# Patient Record
Sex: FEMALE | Race: WHITE | Employment: OTHER | URBAN - METROPOLITAN AREA
[De-identification: names, ages, dates, MRNs, and addresses within clinical notes are randomized per-mention and may not be internally consistent; named-entity substitution may affect disease eponyms.]

---

## 2017-08-08 PROBLEM — M54.6 THORACIC BACK PAIN: Status: ACTIVE | Noted: 2017-08-08

## 2017-09-05 PROBLEM — S32.000A COMPRESSION FRACTURE OF LUMBAR VERTEBRA (HCC): Status: ACTIVE | Noted: 2017-09-05

## 2018-05-08 ENCOUNTER — APPOINTMENT (OUTPATIENT)
Dept: CT IMAGING | Age: 83
End: 2018-05-08
Payer: COMMERCIAL

## 2018-05-08 ENCOUNTER — HOSPITAL ENCOUNTER (EMERGENCY)
Age: 83
Discharge: OTHER INSTITUTION | End: 2018-05-08
Attending: EMERGENCY MEDICINE
Payer: COMMERCIAL

## 2018-05-08 VITALS
HEIGHT: 66 IN | DIASTOLIC BLOOD PRESSURE: 69 MMHG | WEIGHT: 135 LBS | BODY MASS INDEX: 21.69 KG/M2 | SYSTOLIC BLOOD PRESSURE: 136 MMHG | OXYGEN SATURATION: 99 % | HEART RATE: 77 BPM | RESPIRATION RATE: 18 BRPM | TEMPERATURE: 97.8 F

## 2018-05-08 DIAGNOSIS — S09.90XA INJURY OF HEAD, INITIAL ENCOUNTER: Primary | ICD-10-CM

## 2018-05-08 DIAGNOSIS — S16.1XXA STRAIN OF NECK MUSCLE, INITIAL ENCOUNTER: ICD-10-CM

## 2018-05-08 LAB
BASOPHILS ABSOLUTE: 0.05 E9/L (ref 0–0.2)
BASOPHILS RELATIVE PERCENT: 0.7 % (ref 0–2)
EOSINOPHILS ABSOLUTE: 0.13 E9/L (ref 0.05–0.5)
EOSINOPHILS RELATIVE PERCENT: 1.7 % (ref 0–6)
HCT VFR BLD CALC: 33.4 % (ref 34–48)
HEMOGLOBIN: 10.8 G/DL (ref 11.5–15.5)
IMMATURE GRANULOCYTES #: 0.07 E9/L
IMMATURE GRANULOCYTES %: 0.9 % (ref 0–5)
INR BLD: 2.2
LYMPHOCYTES ABSOLUTE: 1.09 E9/L (ref 1.5–4)
LYMPHOCYTES RELATIVE PERCENT: 14.7 % (ref 20–42)
MCH RBC QN AUTO: 30.9 PG (ref 26–35)
MCHC RBC AUTO-ENTMCNC: 32.3 % (ref 32–34.5)
MCV RBC AUTO: 95.7 FL (ref 80–99.9)
MONOCYTES ABSOLUTE: 0.55 E9/L (ref 0.1–0.95)
MONOCYTES RELATIVE PERCENT: 7.4 % (ref 2–12)
NEUTROPHILS ABSOLUTE: 5.54 E9/L (ref 1.8–7.3)
NEUTROPHILS RELATIVE PERCENT: 74.6 % (ref 43–80)
PDW BLD-RTO: 13 FL (ref 11.5–15)
PLATELET # BLD: 172 E9/L (ref 130–450)
PMV BLD AUTO: 9.6 FL (ref 7–12)
PROTHROMBIN TIME: 24.9 SEC (ref 9.3–12.4)
RBC # BLD: 3.49 E12/L (ref 3.5–5.5)
WBC # BLD: 7.4 E9/L (ref 4.5–11.5)

## 2018-05-08 PROCEDURE — 85610 PROTHROMBIN TIME: CPT

## 2018-05-08 PROCEDURE — 85025 COMPLETE CBC W/AUTO DIFF WBC: CPT

## 2018-05-08 PROCEDURE — 70450 CT HEAD/BRAIN W/O DYE: CPT

## 2018-05-08 PROCEDURE — 6370000000 HC RX 637 (ALT 250 FOR IP): Performed by: PHYSICIAN ASSISTANT

## 2018-05-08 PROCEDURE — 36415 COLL VENOUS BLD VENIPUNCTURE: CPT

## 2018-05-08 PROCEDURE — 99284 EMERGENCY DEPT VISIT MOD MDM: CPT

## 2018-05-08 PROCEDURE — 72125 CT NECK SPINE W/O DYE: CPT

## 2018-05-08 RX ORDER — TRAMADOL HYDROCHLORIDE 50 MG/1
50 TABLET ORAL ONCE
Status: COMPLETED | OUTPATIENT
Start: 2018-05-08 | End: 2018-05-08

## 2018-05-08 RX ADMIN — TRAMADOL HYDROCHLORIDE 50 MG: 50 TABLET, FILM COATED ORAL at 12:17

## 2018-05-08 ASSESSMENT — PAIN SCALES - GENERAL
PAINLEVEL_OUTOF10: 10
PAINLEVEL_OUTOF10: 10
PAINLEVEL_OUTOF10: 7

## 2018-05-08 ASSESSMENT — PAIN DESCRIPTION - PAIN TYPE: TYPE: ACUTE PAIN

## 2018-05-08 ASSESSMENT — PAIN DESCRIPTION - LOCATION: LOCATION: NECK

## 2018-05-24 ENCOUNTER — HOSPITAL ENCOUNTER (OUTPATIENT)
Dept: CT IMAGING | Age: 83
End: 2018-05-24
Payer: COMMERCIAL

## 2018-05-24 ENCOUNTER — HOSPITAL ENCOUNTER (OUTPATIENT)
Dept: CT IMAGING | Age: 83
Discharge: HOME | End: 2018-05-24
Payer: COMMERCIAL
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
## 2018-05-24 DIAGNOSIS — R51.9 FACIAL PAIN: ICD-10-CM

## 2018-05-24 DIAGNOSIS — R51.9 NONINTRACTABLE HEADACHE, UNSPECIFIED CHRONICITY PATTERN, UNSPECIFIED HEADACHE TYPE: ICD-10-CM

## 2018-05-24 PROCEDURE — 70450 CT HEAD/BRAIN W/O DYE: CPT

## 2018-05-24 PROCEDURE — 70486 CT MAXILLOFACIAL W/O DYE: CPT

## 2018-08-13 ENCOUNTER — HOSPITAL ENCOUNTER (OUTPATIENT)
Dept: CT IMAGING | Age: 83
Discharge: HOME | End: 2018-08-13
Payer: COMMERCIAL

## 2018-08-13 DIAGNOSIS — R91.1 PULMONARY NODULE: ICD-10-CM

## 2018-08-13 PROCEDURE — 71250 CT THORAX DX C-: CPT

## 2019-05-08 ENCOUNTER — APPOINTMENT (OUTPATIENT)
Dept: CT IMAGING | Age: 84
DRG: 392 | End: 2019-05-08
Payer: COMMERCIAL

## 2019-05-08 ENCOUNTER — ANESTHESIA EVENT (OUTPATIENT)
Dept: ENDOSCOPY | Age: 84
DRG: 392 | End: 2019-05-08
Payer: COMMERCIAL

## 2019-05-08 ENCOUNTER — APPOINTMENT (OUTPATIENT)
Dept: GENERAL RADIOLOGY | Age: 84
DRG: 392 | End: 2019-05-08
Payer: COMMERCIAL

## 2019-05-08 ENCOUNTER — HOSPITAL ENCOUNTER (INPATIENT)
Age: 84
LOS: 3 days | Discharge: NURSING, REHAB OR LONG-TERM CARE | DRG: 392 | End: 2019-05-11
Attending: EMERGENCY MEDICINE | Admitting: INTERNAL MEDICINE
Payer: COMMERCIAL

## 2019-05-08 DIAGNOSIS — K92.2 GASTROINTESTINAL HEMORRHAGE, UNSPECIFIED GASTROINTESTINAL HEMORRHAGE TYPE: ICD-10-CM

## 2019-05-08 DIAGNOSIS — R10.31 RIGHT LOWER QUADRANT ABDOMINAL PAIN: Primary | ICD-10-CM

## 2019-05-08 LAB
ALBUMIN SERPL-MCNC: 3.6 G/DL (ref 3.5–5.2)
ALP BLD-CCNC: 79 U/L (ref 35–104)
ALT SERPL-CCNC: 12 U/L (ref 0–32)
ANION GAP SERPL CALCULATED.3IONS-SCNC: 11 MMOL/L (ref 7–16)
AST SERPL-CCNC: 16 U/L (ref 0–31)
BASOPHILS ABSOLUTE: 0.04 E9/L (ref 0–0.2)
BASOPHILS RELATIVE PERCENT: 0.3 % (ref 0–2)
BILIRUB SERPL-MCNC: 0.3 MG/DL (ref 0–1.2)
BUN BLDV-MCNC: 19 MG/DL (ref 8–23)
CALCIUM SERPL-MCNC: 9.4 MG/DL (ref 8.6–10.2)
CHLORIDE BLD-SCNC: 101 MMOL/L (ref 98–107)
CO2: 28 MMOL/L (ref 22–29)
CREAT SERPL-MCNC: 0.8 MG/DL (ref 0.5–1)
EKG ATRIAL RATE: 71 BPM
EKG P AXIS: 40 DEGREES
EKG P-R INTERVAL: 152 MS
EKG Q-T INTERVAL: 436 MS
EKG QRS DURATION: 70 MS
EKG QTC CALCULATION (BAZETT): 473 MS
EKG R AXIS: 7 DEGREES
EKG T AXIS: 23 DEGREES
EKG VENTRICULAR RATE: 71 BPM
EOSINOPHILS ABSOLUTE: 0.01 E9/L (ref 0.05–0.5)
EOSINOPHILS RELATIVE PERCENT: 0.1 % (ref 0–6)
GFR AFRICAN AMERICAN: >60
GFR NON-AFRICAN AMERICAN: >60 ML/MIN/1.73
GLUCOSE BLD-MCNC: 117 MG/DL (ref 74–99)
HCT VFR BLD CALC: 36.7 % (ref 34–48)
HEMOGLOBIN: 11.9 G/DL (ref 11.5–15.5)
IMMATURE GRANULOCYTES #: 0.06 E9/L
IMMATURE GRANULOCYTES %: 0.5 % (ref 0–5)
INR BLD: 1.1
LIPASE: 29 U/L (ref 13–60)
LYMPHOCYTES ABSOLUTE: 1.36 E9/L (ref 1.5–4)
LYMPHOCYTES RELATIVE PERCENT: 11.6 % (ref 20–42)
MCH RBC QN AUTO: 31.1 PG (ref 26–35)
MCHC RBC AUTO-ENTMCNC: 32.4 % (ref 32–34.5)
MCV RBC AUTO: 95.8 FL (ref 80–99.9)
MONOCYTES ABSOLUTE: 0.85 E9/L (ref 0.1–0.95)
MONOCYTES RELATIVE PERCENT: 7.3 % (ref 2–12)
NEUTROPHILS ABSOLUTE: 9.37 E9/L (ref 1.8–7.3)
NEUTROPHILS RELATIVE PERCENT: 80.2 % (ref 43–80)
PDW BLD-RTO: 13.8 FL (ref 11.5–15)
PLATELET # BLD: 251 E9/L (ref 130–450)
PMV BLD AUTO: 9.3 FL (ref 7–12)
POTASSIUM SERPL-SCNC: 4.2 MMOL/L (ref 3.5–5)
PROTHROMBIN TIME: 12.7 SEC (ref 9.3–12.4)
RBC # BLD: 3.83 E12/L (ref 3.5–5.5)
SODIUM BLD-SCNC: 140 MMOL/L (ref 132–146)
TOTAL PROTEIN: 6.4 G/DL (ref 6.4–8.3)
TROPONIN: <0.01 NG/ML (ref 0–0.03)
WBC # BLD: 11.7 E9/L (ref 4.5–11.5)

## 2019-05-08 PROCEDURE — 6360000002 HC RX W HCPCS: Performed by: PREVENTIVE MEDICINE

## 2019-05-08 PROCEDURE — 6360000004 HC RX CONTRAST MEDICATION: Performed by: RADIOLOGY

## 2019-05-08 PROCEDURE — 96365 THER/PROPH/DIAG IV INF INIT: CPT

## 2019-05-08 PROCEDURE — 96375 TX/PRO/DX INJ NEW DRUG ADDON: CPT

## 2019-05-08 PROCEDURE — 2580000003 HC RX 258: Performed by: EMERGENCY MEDICINE

## 2019-05-08 PROCEDURE — 6360000002 HC RX W HCPCS: Performed by: EMERGENCY MEDICINE

## 2019-05-08 PROCEDURE — 1200000000 HC SEMI PRIVATE

## 2019-05-08 PROCEDURE — 80053 COMPREHEN METABOLIC PANEL: CPT

## 2019-05-08 PROCEDURE — 36415 COLL VENOUS BLD VENIPUNCTURE: CPT

## 2019-05-08 PROCEDURE — 93005 ELECTROCARDIOGRAM TRACING: CPT | Performed by: STUDENT IN AN ORGANIZED HEALTH CARE EDUCATION/TRAINING PROGRAM

## 2019-05-08 PROCEDURE — 74177 CT ABD & PELVIS W/CONTRAST: CPT

## 2019-05-08 PROCEDURE — 71045 X-RAY EXAM CHEST 1 VIEW: CPT

## 2019-05-08 PROCEDURE — 85025 COMPLETE CBC W/AUTO DIFF WBC: CPT

## 2019-05-08 PROCEDURE — 99285 EMERGENCY DEPT VISIT HI MDM: CPT

## 2019-05-08 PROCEDURE — 2580000003 HC RX 258: Performed by: INTERNAL MEDICINE

## 2019-05-08 PROCEDURE — 83690 ASSAY OF LIPASE: CPT

## 2019-05-08 PROCEDURE — 93010 ELECTROCARDIOGRAM REPORT: CPT | Performed by: INTERNAL MEDICINE

## 2019-05-08 PROCEDURE — 85610 PROTHROMBIN TIME: CPT

## 2019-05-08 PROCEDURE — 96376 TX/PRO/DX INJ SAME DRUG ADON: CPT

## 2019-05-08 PROCEDURE — C9113 INJ PANTOPRAZOLE SODIUM, VIA: HCPCS | Performed by: EMERGENCY MEDICINE

## 2019-05-08 PROCEDURE — 2500000003 HC RX 250 WO HCPCS: Performed by: EMERGENCY MEDICINE

## 2019-05-08 PROCEDURE — 2500000003 HC RX 250 WO HCPCS: Performed by: STUDENT IN AN ORGANIZED HEALTH CARE EDUCATION/TRAINING PROGRAM

## 2019-05-08 PROCEDURE — 2580000003 HC RX 258: Performed by: STUDENT IN AN ORGANIZED HEALTH CARE EDUCATION/TRAINING PROGRAM

## 2019-05-08 PROCEDURE — 84484 ASSAY OF TROPONIN QUANT: CPT

## 2019-05-08 PROCEDURE — C9113 INJ PANTOPRAZOLE SODIUM, VIA: HCPCS | Performed by: PREVENTIVE MEDICINE

## 2019-05-08 RX ORDER — DOCUSATE SODIUM 100 MG/1
100 CAPSULE, LIQUID FILLED ORAL DAILY PRN
COMMUNITY

## 2019-05-08 RX ORDER — 0.9 % SODIUM CHLORIDE 0.9 %
10 VIAL (ML) INJECTION DAILY
Status: DISCONTINUED | OUTPATIENT
Start: 2019-05-11 | End: 2019-05-09 | Stop reason: SDUPTHER

## 2019-05-08 RX ORDER — FUROSEMIDE 20 MG/1
20 TABLET ORAL
COMMUNITY

## 2019-05-08 RX ORDER — POTASSIUM CHLORIDE 20 MEQ/1
20 TABLET, EXTENDED RELEASE ORAL NIGHTLY
COMMUNITY

## 2019-05-08 RX ORDER — PANTOPRAZOLE SODIUM 40 MG/1
40 TABLET, DELAYED RELEASE ORAL 2 TIMES DAILY
COMMUNITY

## 2019-05-08 RX ORDER — 0.9 % SODIUM CHLORIDE 0.9 %
1000 INTRAVENOUS SOLUTION INTRAVENOUS ONCE
Status: COMPLETED | OUTPATIENT
Start: 2019-05-08 | End: 2019-05-08

## 2019-05-08 RX ORDER — PANTOPRAZOLE SODIUM 40 MG/10ML
80 INJECTION, POWDER, LYOPHILIZED, FOR SOLUTION INTRAVENOUS ONCE
Status: COMPLETED | OUTPATIENT
Start: 2019-05-08 | End: 2019-05-08

## 2019-05-08 RX ORDER — FERROUS SULFATE 325(65) MG
325 TABLET ORAL DAILY
Status: ON HOLD | COMMUNITY
End: 2019-05-11 | Stop reason: HOSPADM

## 2019-05-08 RX ORDER — ACETAMINOPHEN 325 MG/1
650 TABLET ORAL EVERY 4 HOURS PRN
COMMUNITY

## 2019-05-08 RX ORDER — SERTRALINE HYDROCHLORIDE 100 MG/1
100 TABLET, FILM COATED ORAL DAILY
COMMUNITY

## 2019-05-08 RX ORDER — CHOLECALCIFEROL (VITAMIN D3) 50 MCG
2000 TABLET ORAL NIGHTLY
COMMUNITY

## 2019-05-08 RX ORDER — BISACODYL 10 MG
10 SUPPOSITORY, RECTAL RECTAL DAILY PRN
COMMUNITY

## 2019-05-08 RX ORDER — ONDANSETRON 2 MG/ML
4 INJECTION INTRAMUSCULAR; INTRAVENOUS EVERY 6 HOURS PRN
Status: DISCONTINUED | OUTPATIENT
Start: 2019-05-08 | End: 2019-05-09 | Stop reason: SDUPTHER

## 2019-05-08 RX ORDER — ACETAMINOPHEN 325 MG/1
650 TABLET ORAL EVERY 4 HOURS PRN
Status: DISCONTINUED | OUTPATIENT
Start: 2019-05-08 | End: 2019-05-09 | Stop reason: SDUPTHER

## 2019-05-08 RX ORDER — SODIUM CHLORIDE 0.9 % (FLUSH) 0.9 %
10 SYRINGE (ML) INJECTION PRN
Status: DISCONTINUED | OUTPATIENT
Start: 2019-05-08 | End: 2019-05-11 | Stop reason: HOSPADM

## 2019-05-08 RX ORDER — SODIUM CHLORIDE 0.9 % (FLUSH) 0.9 %
10 SYRINGE (ML) INJECTION EVERY 12 HOURS SCHEDULED
Status: DISCONTINUED | OUTPATIENT
Start: 2019-05-08 | End: 2019-05-11 | Stop reason: HOSPADM

## 2019-05-08 RX ORDER — PANTOPRAZOLE SODIUM 40 MG/10ML
40 INJECTION, POWDER, LYOPHILIZED, FOR SOLUTION INTRAVENOUS DAILY
Status: DISCONTINUED | OUTPATIENT
Start: 2019-05-11 | End: 2019-05-08 | Stop reason: SDUPTHER

## 2019-05-08 RX ADMIN — SODIUM CHLORIDE 1000 ML: 9 INJECTION, SOLUTION INTRAVENOUS at 15:15

## 2019-05-08 RX ADMIN — PANTOPRAZOLE SODIUM 80 MG: 40 INJECTION, POWDER, LYOPHILIZED, FOR SOLUTION INTRAVENOUS at 16:52

## 2019-05-08 RX ADMIN — WATER 2 G: 1 INJECTION INTRAMUSCULAR; INTRAVENOUS; SUBCUTANEOUS at 19:43

## 2019-05-08 RX ADMIN — SODIUM CHLORIDE 80 MG: 9 INJECTION, SOLUTION INTRAVENOUS at 18:12

## 2019-05-08 RX ADMIN — METRONIDAZOLE 500 MG: 500 INJECTION, SOLUTION INTRAVENOUS at 18:26

## 2019-05-08 RX ADMIN — FAMOTIDINE 20 MG: 10 INJECTION, SOLUTION INTRAVENOUS at 15:15

## 2019-05-08 RX ADMIN — IOPAMIDOL 80 ML: 755 INJECTION, SOLUTION INTRAVENOUS at 16:29

## 2019-05-08 RX ADMIN — SODIUM CHLORIDE 8 MG/HR: 9 INJECTION, SOLUTION INTRAVENOUS at 19:48

## 2019-05-08 RX ADMIN — Medication 10 ML: at 22:47

## 2019-05-08 ASSESSMENT — ENCOUNTER SYMPTOMS
VOMITING: 1
ABDOMINAL PAIN: 1
CONSTIPATION: 0
NAUSEA: 1
COLOR CHANGE: 0
SHORTNESS OF BREATH: 0
WHEEZING: 0
BLOOD IN STOOL: 1
DIARRHEA: 0
COUGH: 0

## 2019-05-08 ASSESSMENT — PAIN SCALES - GENERAL
PAINLEVEL_OUTOF10: 0
PAINLEVEL_OUTOF10: 0
PAINLEVEL_OUTOF10: 9

## 2019-05-08 ASSESSMENT — PAIN DESCRIPTION - PAIN TYPE
TYPE: ACUTE PAIN
TYPE: ACUTE PAIN

## 2019-05-08 ASSESSMENT — PAIN DESCRIPTION - LOCATION
LOCATION: ABDOMEN
LOCATION: ABDOMEN

## 2019-05-08 ASSESSMENT — PAIN DESCRIPTION - DESCRIPTORS: DESCRIPTORS: SHOOTING

## 2019-05-08 NOTE — ED PROVIDER NOTES
Patient is an 49-year-old female with history of CVA, on Coumadin, GERD, hyperlipidemia, hypertension, paroxysmal atrial fibrillation, who presents to the ED for abdominal pain, nausea, vomiting, and hematemesis. Patient states that she has had one episode of vomiting yesterday, that was black and coffee ground at that time. She states that she has had worsening abdominal pain that began today. She states that it is in her right upper quadrant and epigastric region. She is coming in from [place] [address] at [place]. They state that the residents there have also been sick. She is also complaining of some darker stools. The history is provided by the patient. No  was used. Review of Systems   Constitutional: Negative for chills and fever. Respiratory: Negative for cough, shortness of breath and wheezing. Cardiovascular: Negative for chest pain and palpitations. Gastrointestinal: Positive for abdominal pain, blood in stool, nausea and vomiting. Negative for constipation and diarrhea. Genitourinary: Negative for dysuria and hematuria. Musculoskeletal: Negative for neck pain and neck stiffness. Skin: Negative for color change, pallor, rash and wound. Neurological: Negative for dizziness, syncope, light-headedness, numbness and headaches. Psychiatric/Behavioral: Negative for confusion and decreased concentration. The patient is not nervous/anxious. Physical Exam   Constitutional: She is oriented to person, place, and time. She appears well-developed and well-nourished. No distress. HENT:   Head: Normocephalic and atraumatic. Right Ear: External ear normal.   Left Ear: External ear normal.   Mouth/Throat: No oropharyngeal exudate. Eyes: Pupils are equal, round, and reactive to light. EOM are normal.   Neck: Normal range of motion. Cardiovascular: Normal rate, regular rhythm, normal heart sounds and intact distal pulses.  Exam reveals no gallop and no friction rub. No murmur heard. Pulmonary/Chest: Effort normal and breath sounds normal. No respiratory distress. She has no wheezes. She has no rales. She exhibits no tenderness. Abdominal: Soft. Bowel sounds are normal. She exhibits no distension and no mass. There is tenderness (Tenderness to palpation around the right abdomen). There is no rebound and no guarding. No hernia. Genitourinary: Rectal exam shows guaiac positive stool (black stools). Musculoskeletal: Normal range of motion. She exhibits no edema, tenderness or deformity. Lymphadenopathy:     She has no cervical adenopathy. Neurological: She is alert and oriented to person, place, and time. No cranial nerve deficit. Skin: Skin is warm and dry. Capillary refill takes less than 2 seconds. No rash noted. She is not diaphoretic. No erythema. No pallor. Psychiatric: She has a normal mood and affect. Her behavior is normal. Judgment and thought content normal.   Nursing note and vitals reviewed. Procedures    MDM    ED Course as of May 08 1452   Wed May 08, 2019   1451 ATTENDING PROVIDER ATTESTATION:     I have personally performed and/or participated in the history, exam, medical decision making, and procedures and agree with all pertinent clinical information unless otherwise noted. I have also reviewed and agree with the past medical, family and social history unless otherwise noted. I have discussed this patient in detail with the resident, and provided the instruction and education regarding patient here complaining of abdominal pain mostly to the right side of her abdomen with some vomiting today and had some dark stools. Has a history of colon cancer with resection although this is a remote history. Denies chest pain, palpitations or shortness of breath. .  My findings/plan: Patient sitting in the bed resting currently in no distress.  Moderate general right sided abdominal tenderness with no distention and no jaundice or icterus. Heart rate regular, lungs are clear anteriorly. She is awake and alert and oriented ×3 and conversant in no distress.           [NC]      ED Course User Index  [NC] DO Rick Tony DO  Resident  05/08/19 0376

## 2019-05-08 NOTE — ED NOTES
Bed: 12  Expected date:   Expected time:   Means of arrival:   Comments:  Litzy Schmidt [facility]  05/08/19 4612

## 2019-05-08 NOTE — ED PROVIDER NOTES
friction rub. No murmur heard. Pulmonary/Chest: Effort normal and breath sounds normal. No respiratory distress. She has no wheezes. She has no rales. She exhibits no tenderness. Abdominal: Soft. Bowel sounds are normal. She exhibits no distension and no mass. There is tenderness (Tenderness to palpation of the right side and right upper quadrant and epigastric area. ). There is no rebound and no guarding. No hernia. Genitourinary: Rectal exam shows guaiac positive stool (black stools). Musculoskeletal: Normal range of motion. She exhibits no edema, tenderness or deformity. Lymphadenopathy:     She has no cervical adenopathy. Neurological: She is alert and oriented to person, place, and time. No cranial nerve deficit. Skin: Skin is warm and dry. Capillary refill takes less than 2 seconds. No rash noted. She is not diaphoretic. No erythema. No pallor. Psychiatric: She has a normal mood and affect. Her behavior is normal. Judgment and thought content normal.   Nursing note and vitals reviewed. Procedures    MDM    ED Course as of May 09 0148   Wed May 08, 2019   1451 ATTENDING PROVIDER ATTESTATION:     I have personally performed and/or participated in the history, exam, medical decision making, and procedures and agree with all pertinent clinical information unless otherwise noted. I have also reviewed and agree with the past medical, family and social history unless otherwise noted. I have discussed this patient in detail with the resident, and provided the instruction and education regarding patient here complaining of abdominal pain mostly to the right side of her abdomen with some vomiting today and had some dark stools. Has a history of colon cancer with resection although this is a remote history. Denies chest pain, palpitations or shortness of breath. .  My findings/plan: Patient sitting in the bed resting currently in no distress.  Moderate general right sided abdominal tenderness differential   Result Value Ref Range    WBC 11.7 (H) 4.5 - 11.5 E9/L    RBC 3.83 3.50 - 5.50 E12/L    Hemoglobin 11.9 11.5 - 15.5 g/dL    Hematocrit 36.7 34.0 - 48.0 %    MCV 95.8 80.0 - 99.9 fL    MCH 31.1 26.0 - 35.0 pg    MCHC 32.4 32.0 - 34.5 %    RDW 13.8 11.5 - 15.0 fL    Platelets 195 546 - 772 E9/L    MPV 9.3 7.0 - 12.0 fL    Neutrophils % 80.2 (H) 43.0 - 80.0 %    Immature Granulocytes % 0.5 0.0 - 5.0 %    Lymphocytes % 11.6 (L) 20.0 - 42.0 %    Monocytes % 7.3 2.0 - 12.0 %    Eosinophils % 0.1 0.0 - 6.0 %    Basophils % 0.3 0.0 - 2.0 %    Neutrophils # 9.37 (H) 1.80 - 7.30 E9/L    Immature Granulocytes # 0.06 E9/L    Lymphocytes # 1.36 (L) 1.50 - 4.00 E9/L    Monocytes # 0.85 0.10 - 0.95 E9/L    Eosinophils # 0.01 (L) 0.05 - 0.50 E9/L    Basophils # 0.04 0.00 - 0.20 E9/L   Comprehensive Metabolic Panel   Result Value Ref Range    Sodium 140 132 - 146 mmol/L    Potassium 4.2 3.5 - 5.0 mmol/L    Chloride 101 98 - 107 mmol/L    CO2 28 22 - 29 mmol/L    Anion Gap 11 7 - 16 mmol/L    Glucose 117 (H) 74 - 99 mg/dL    BUN 19 8 - 23 mg/dL    CREATININE 0.8 0.5 - 1.0 mg/dL    GFR Non-African American >60 >=60 mL/min/1.73    GFR African American >60     Calcium 9.4 8.6 - 10.2 mg/dL    Total Protein 6.4 6.4 - 8.3 g/dL    Alb 3.6 3.5 - 5.2 g/dL    Total Bilirubin 0.3 0.0 - 1.2 mg/dL    Alkaline Phosphatase 79 35 - 104 U/L    ALT 12 0 - 32 U/L    AST 16 0 - 31 U/L   Protime-INR   Result Value Ref Range    Protime 12.7 (H) 9.3 - 12.4 sec    INR 1.1    Troponin   Result Value Ref Range    Troponin <0.01 0.00 - 0.03 ng/mL   Lipase   Result Value Ref Range    Lipase 29 13 - 60 U/L   EKG 12 Lead   Result Value Ref Range    Ventricular Rate 71 BPM    Atrial Rate 71 BPM    P-R Interval 152 ms    QRS Duration 70 ms    Q-T Interval 436 ms    QTc Calculation (Bazett) 473 ms    P Axis 40 degrees    R Axis 7 degrees    T Axis 23 degrees       RADIOLOGY:      ------------------------------NURSING NOTES AND VITALS REVIEWED -----------------------     The nursing notes within the ED encounter and vital signs as below have been reviewed. /60   Pulse 65   Temp 98.9 °F (37.2 °C) (Oral)   Resp 18   Ht 5' 5\" (1.651 m)   Wt 148 lb 8 oz (67.4 kg)   SpO2 97%   BMI 24.71 kg/m²   Oxygen Saturation Interpretation: Normal      ------------------------------------------- PROGRESS NOTES ------------------------------------------                  I have spoken with the daughter and patient and discussed todays results, in addition to providing specific details for the plan of care and counseling regarding the diagnosis and prognosis. Questions were answered at this time and are agreeable with the plan.     ED Course Medications:                Medications   pantoprazole (PROTONIX) 80 mg in sodium chloride 0.9 % 100 mL bolus (0 mg Intravenous Stopped 5/8/19 1825)     Followed by   pantoprazole (PROTONIX) 80 mg in sodium chloride 0.9 % 100 mL infusion (8 mg/hr Intravenous New Bag 5/8/19 1948)   ondansetron (ZOFRAN) injection 4 mg (has no administration in time range)   sodium chloride (PF) 0.9 % injection 10 mL (has no administration in time range)   sodium chloride flush 0.9 % injection 10 mL (10 mLs Intravenous Given 5/8/19 2247)   sodium chloride flush 0.9 % injection 10 mL (has no administration in time range)   acetaminophen (TYLENOL) tablet 650 mg (has no administration in time range)   0.9 % sodium chloride bolus (0 mLs Intravenous Stopped 5/8/19 1643)   famotidine (PEPCID) injection 20 mg (20 mg Intravenous Given 5/8/19 1515)   pantoprazole (PROTONIX) injection 80 mg (80 mg Intravenous Given 5/8/19 1652)   iopamidol (ISOVUE-370) 76 % injection 80 mL (80 mLs Intravenous Given 5/8/19 1629)   cefTRIAXone (ROCEPHIN) 2 g in sterile water 20 mL IV syringe (2 g Intravenous Given 5/8/19 1943)   metronidazole (FLAGYL) 500 mg in NaCl 100 mL IVPB premix (0 mg Intravenous Stopped 5/8/19 1942)       Re-examinations:              Time: 1600 Patients symptoms show no change. Repeat physical examination is unchanged. Consultations:         Spoke with Dr. Chuy Pickering from Gastroenterology for evaluation he would like patient on Protonix drip, Dr. Yrn Bingham will admit patient. PROCEDURES:               none.    --------------------------------------- IMPRESSION & DISPOSITION -----------------------------     IMPRESSION:  1. Right lower quadrant abdominal pain    2. Gastrointestinal hemorrhage, unspecified gastrointestinal hemorrhage type        DISPOSITION  Disposition: Admit to telemetry. Patient condition is stable. END OF PROVIDER NOTE.         Rosalina Fuentes MD  05/09/19 5149

## 2019-05-09 ENCOUNTER — ANESTHESIA (OUTPATIENT)
Dept: ENDOSCOPY | Age: 84
DRG: 392 | End: 2019-05-09
Payer: COMMERCIAL

## 2019-05-09 VITALS
DIASTOLIC BLOOD PRESSURE: 38 MMHG | RESPIRATION RATE: 27 BRPM | OXYGEN SATURATION: 100 % | SYSTOLIC BLOOD PRESSURE: 98 MMHG

## 2019-05-09 LAB
TROPONIN: <0.01 NG/ML (ref 0–0.03)

## 2019-05-09 PROCEDURE — 3700000000 HC ANESTHESIA ATTENDED CARE: Performed by: INTERNAL MEDICINE

## 2019-05-09 PROCEDURE — 2500000003 HC RX 250 WO HCPCS: Performed by: INTERNAL MEDICINE

## 2019-05-09 PROCEDURE — 2580000003 HC RX 258: Performed by: EMERGENCY MEDICINE

## 2019-05-09 PROCEDURE — 7100000011 HC PHASE II RECOVERY - ADDTL 15 MIN: Performed by: INTERNAL MEDICINE

## 2019-05-09 PROCEDURE — 6360000002 HC RX W HCPCS: Performed by: INTERNAL MEDICINE

## 2019-05-09 PROCEDURE — 6360000002 HC RX W HCPCS: Performed by: NURSE ANESTHETIST, CERTIFIED REGISTERED

## 2019-05-09 PROCEDURE — 2580000003 HC RX 258: Performed by: INTERNAL MEDICINE

## 2019-05-09 PROCEDURE — 84484 ASSAY OF TROPONIN QUANT: CPT

## 2019-05-09 PROCEDURE — 88342 IMHCHEM/IMCYTCHM 1ST ANTB: CPT

## 2019-05-09 PROCEDURE — C9113 INJ PANTOPRAZOLE SODIUM, VIA: HCPCS | Performed by: EMERGENCY MEDICINE

## 2019-05-09 PROCEDURE — 0DB78ZX EXCISION OF STOMACH, PYLORUS, VIA NATURAL OR ARTIFICIAL OPENING ENDOSCOPIC, DIAGNOSTIC: ICD-10-PCS | Performed by: INTERNAL MEDICINE

## 2019-05-09 PROCEDURE — 6360000002 HC RX W HCPCS: Performed by: EMERGENCY MEDICINE

## 2019-05-09 PROCEDURE — 3700000001 HC ADD 15 MINUTES (ANESTHESIA): Performed by: INTERNAL MEDICINE

## 2019-05-09 PROCEDURE — 2709999900 HC NON-CHARGEABLE SUPPLY: Performed by: INTERNAL MEDICINE

## 2019-05-09 PROCEDURE — 2580000003 HC RX 258: Performed by: NURSE ANESTHETIST, CERTIFIED REGISTERED

## 2019-05-09 PROCEDURE — 6370000000 HC RX 637 (ALT 250 FOR IP): Performed by: INTERNAL MEDICINE

## 2019-05-09 PROCEDURE — 36415 COLL VENOUS BLD VENIPUNCTURE: CPT

## 2019-05-09 PROCEDURE — 88305 TISSUE EXAM BY PATHOLOGIST: CPT

## 2019-05-09 PROCEDURE — 7100000010 HC PHASE II RECOVERY - FIRST 15 MIN: Performed by: INTERNAL MEDICINE

## 2019-05-09 PROCEDURE — 3609012400 HC EGD TRANSORAL BIOPSY SINGLE/MULTIPLE: Performed by: INTERNAL MEDICINE

## 2019-05-09 PROCEDURE — 1200000000 HC SEMI PRIVATE

## 2019-05-09 RX ORDER — LEVOFLOXACIN 25 MG/ML
375 INJECTION INTRAVENOUS DAILY
Status: DISCONTINUED | OUTPATIENT
Start: 2019-05-09 | End: 2019-05-09 | Stop reason: CLARIF

## 2019-05-09 RX ORDER — DOCUSATE SODIUM 100 MG/1
100 CAPSULE, LIQUID FILLED ORAL DAILY PRN
Status: DISCONTINUED | OUTPATIENT
Start: 2019-05-09 | End: 2019-05-11 | Stop reason: HOSPADM

## 2019-05-09 RX ORDER — ACETAMINOPHEN 325 MG/1
650 TABLET ORAL EVERY 6 HOURS PRN
Status: DISCONTINUED | OUTPATIENT
Start: 2019-05-09 | End: 2019-05-11 | Stop reason: HOSPADM

## 2019-05-09 RX ORDER — SODIUM CHLORIDE 9 MG/ML
INJECTION, SOLUTION INTRAVENOUS CONTINUOUS
Status: DISCONTINUED | OUTPATIENT
Start: 2019-05-09 | End: 2019-05-11 | Stop reason: HOSPADM

## 2019-05-09 RX ORDER — ONDANSETRON 2 MG/ML
4 INJECTION INTRAMUSCULAR; INTRAVENOUS EVERY 6 HOURS PRN
Status: DISCONTINUED | OUTPATIENT
Start: 2019-05-09 | End: 2019-05-11 | Stop reason: HOSPADM

## 2019-05-09 RX ORDER — CHLORAL HYDRATE 500 MG
1000 CAPSULE ORAL DAILY
Status: DISCONTINUED | OUTPATIENT
Start: 2019-05-09 | End: 2019-05-09 | Stop reason: RX

## 2019-05-09 RX ORDER — AMLODIPINE BESYLATE AND BENAZEPRIL HYDROCHLORIDE 5; 10 MG/1; MG/1
1 CAPSULE ORAL DAILY
Status: DISCONTINUED | OUTPATIENT
Start: 2019-05-09 | End: 2019-05-09 | Stop reason: CLARIF

## 2019-05-09 RX ORDER — PROPOFOL 10 MG/ML
INJECTION, EMULSION INTRAVENOUS PRN
Status: DISCONTINUED | OUTPATIENT
Start: 2019-05-09 | End: 2019-05-09 | Stop reason: SDUPTHER

## 2019-05-09 RX ORDER — BENAZEPRIL HYDROCHLORIDE 10 MG/1
10 TABLET ORAL DAILY
Status: DISCONTINUED | OUTPATIENT
Start: 2019-05-09 | End: 2019-05-11 | Stop reason: HOSPADM

## 2019-05-09 RX ORDER — LOPERAMIDE HYDROCHLORIDE 2 MG/1
2 CAPSULE ORAL 4 TIMES DAILY PRN
Status: DISCONTINUED | OUTPATIENT
Start: 2019-05-09 | End: 2019-05-11 | Stop reason: HOSPADM

## 2019-05-09 RX ORDER — PANTOPRAZOLE SODIUM 40 MG/1
40 TABLET, DELAYED RELEASE ORAL 2 TIMES DAILY
Status: DISCONTINUED | OUTPATIENT
Start: 2019-05-09 | End: 2019-05-11 | Stop reason: HOSPADM

## 2019-05-09 RX ORDER — TRAMADOL HYDROCHLORIDE 50 MG/1
50 TABLET ORAL EVERY 8 HOURS PRN
Status: DISCONTINUED | OUTPATIENT
Start: 2019-05-09 | End: 2019-05-11 | Stop reason: HOSPADM

## 2019-05-09 RX ORDER — LEVOTHYROXINE SODIUM 0.07 MG/1
75 TABLET ORAL DAILY
Status: DISCONTINUED | OUTPATIENT
Start: 2019-05-09 | End: 2019-05-11 | Stop reason: HOSPADM

## 2019-05-09 RX ORDER — SODIUM CHLORIDE, SODIUM LACTATE, POTASSIUM CHLORIDE, CALCIUM CHLORIDE 600; 310; 30; 20 MG/100ML; MG/100ML; MG/100ML; MG/100ML
INJECTION, SOLUTION INTRAVENOUS CONTINUOUS PRN
Status: DISCONTINUED | OUTPATIENT
Start: 2019-05-09 | End: 2019-05-09 | Stop reason: SDUPTHER

## 2019-05-09 RX ORDER — CIPROFLOXACIN 2 MG/ML
400 INJECTION, SOLUTION INTRAVENOUS EVERY 12 HOURS
Status: DISCONTINUED | OUTPATIENT
Start: 2019-05-09 | End: 2019-05-10

## 2019-05-09 RX ORDER — AMLODIPINE BESYLATE 5 MG/1
5 TABLET ORAL DAILY
Status: DISCONTINUED | OUTPATIENT
Start: 2019-05-09 | End: 2019-05-11 | Stop reason: HOSPADM

## 2019-05-09 RX ORDER — POTASSIUM CHLORIDE 20 MEQ/1
20 TABLET, EXTENDED RELEASE ORAL NIGHTLY
Status: DISCONTINUED | OUTPATIENT
Start: 2019-05-09 | End: 2019-05-11 | Stop reason: HOSPADM

## 2019-05-09 RX ORDER — BISACODYL 10 MG
10 SUPPOSITORY, RECTAL RECTAL DAILY PRN
Status: DISCONTINUED | OUTPATIENT
Start: 2019-05-09 | End: 2019-05-11 | Stop reason: HOSPADM

## 2019-05-09 RX ORDER — FUROSEMIDE 20 MG/1
20 TABLET ORAL
Status: DISCONTINUED | OUTPATIENT
Start: 2019-05-09 | End: 2019-05-11 | Stop reason: HOSPADM

## 2019-05-09 RX ADMIN — SODIUM CHLORIDE, POTASSIUM CHLORIDE, SODIUM LACTATE AND CALCIUM CHLORIDE: 600; 310; 30; 20 INJECTION, SOLUTION INTRAVENOUS at 16:25

## 2019-05-09 RX ADMIN — POTASSIUM CHLORIDE 20 MEQ: 20 TABLET, EXTENDED RELEASE ORAL at 21:51

## 2019-05-09 RX ADMIN — OYSTER SHELL CALCIUM WITH VITAMIN D 1 TABLET: 500; 200 TABLET, FILM COATED ORAL at 18:29

## 2019-05-09 RX ADMIN — SODIUM CHLORIDE 8 MG/HR: 9 INJECTION, SOLUTION INTRAVENOUS at 03:26

## 2019-05-09 RX ADMIN — Medication 10 ML: at 08:29

## 2019-05-09 RX ADMIN — CIPROFLOXACIN 400 MG: 2 INJECTION, SOLUTION INTRAVENOUS at 13:00

## 2019-05-09 RX ADMIN — SODIUM CHLORIDE: 9 INJECTION, SOLUTION INTRAVENOUS at 08:29

## 2019-05-09 RX ADMIN — METRONIDAZOLE 500 MG: 500 INJECTION, SOLUTION INTRAVENOUS at 08:29

## 2019-05-09 RX ADMIN — SODIUM CHLORIDE: 9 INJECTION, SOLUTION INTRAVENOUS at 21:51

## 2019-05-09 RX ADMIN — METRONIDAZOLE 500 MG: 500 INJECTION, SOLUTION INTRAVENOUS at 21:51

## 2019-05-09 RX ADMIN — RIVAROXABAN 20 MG: 20 TABLET, FILM COATED ORAL at 18:29

## 2019-05-09 RX ADMIN — PHENYLEPHRINE HYDROCHLORIDE 100 MCG: 10 INJECTION INTRAVENOUS at 16:59

## 2019-05-09 RX ADMIN — PANTOPRAZOLE SODIUM 40 MG: 40 TABLET, DELAYED RELEASE ORAL at 21:51

## 2019-05-09 RX ADMIN — METRONIDAZOLE 500 MG: 500 INJECTION, SOLUTION INTRAVENOUS at 15:04

## 2019-05-09 RX ADMIN — PROPOFOL 200 MG: 10 INJECTION, EMULSION INTRAVENOUS at 16:55

## 2019-05-09 RX ADMIN — VITAMIN D 2000 UNITS: 25 TAB ORAL at 21:52

## 2019-05-09 ASSESSMENT — PAIN SCALES - GENERAL
PAINLEVEL_OUTOF10: 0

## 2019-05-09 NOTE — DISCHARGE INSTR - COC
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    Spinal stenosis, lumbar region, without neurogenic claudication M48.061    Thoracic back pain M54.6    Compression fracture of lumbar vertebra (HCC) S32.000A    Right lower quadrant abdominal pain R10.31    GI bleed K92.2       Isolation/Infection:   Isolation          No Isolation            Nurse Assessment:  Last Vital Signs: /61   Pulse 61   Temp 98.7 °F (37.1 °C) (Oral)   Resp 18   Ht 5' 5\" (1.651 m)   Wt 148 lb 8 oz (67.4 kg)   SpO2 97%   BMI 24.71 kg/m²     Last documented pain score (0-10 scale): Pain Level: 0  Last Weight:   Wt Readings from Last 1 Encounters:   05/08/19 148 lb 8 oz (67.4 kg)     Mental Status:  Alert and Oriented x 4    IV Access:  - None    Nursing Mobility/ADLs:  Walking   Wheel-chair bound  Transfer  Dependent  Bathing  Dependent  Dressing  Dependent  Toileting  Dependent  Feeding  Assisted  Med Admin  Assisted  Med Delivery   whole    Wound Care Documentation and Therapy:  Incision 06/17/16 Back (Active)   Number of days: 1055       Incision 09/28/17 Back Mid;Lower (Active)   Number of days: 588        Elimination:  Continence:   · Bowel: No  · Bladder: No  Urinary Catheter: None   Colostomy/Ileostomy/Ileal Conduit: No       Date of Last BM: 05/10/19    Intake/Output Summary (Last 24 hours) at 5/9/2019 1131  Last data filed at 5/9/2019 3439  Gross per 24 hour   Intake 81 ml   Output --   Net 81 ml     I/O last 3 completed shifts: In: 80 [I.V.:81]  Out: -     Safety Concerns: At Risk for Falls    Impairments/Disabilities:      patient is wheel chair bound    Nutrition Therapy:  Current Nutrition Therapy:   - Oral Diet:  Dental Soft    Routes of Feeding: Oral  Liquids:  Thin Liquids  Daily

## 2019-05-09 NOTE — PLAN OF CARE
Problem: Falls - Risk of:  Goal: Will remain free from falls  Description  Will remain free from falls  5/9/2019 0511 by Peace Conley RN  Outcome: Met This Shift     Problem: Falls - Risk of:  Goal: Absence of physical injury  Description  Absence of physical injury  Outcome: Met This Shift     Problem: Risk for Impaired Skin Integrity  Goal: Tissue integrity - skin and mucous membranes  Description  Structural intactness and normal physiological function of skin and  mucous membranes.   5/9/2019 0511 by Peace Conley RN  Outcome: Met This Shift

## 2019-05-09 NOTE — CARE COORDINATION
Ss note:5/9/2019.11:26 AM Per liaison Pham Butcher pt is from [facility] at [facility]. Pt will require therapy evals prior to return and facility will skill her upon return. No ruma to wait for PRECERT as long as therapy is ordered and completed.  GERALDINE Billingsley

## 2019-05-09 NOTE — ANESTHESIA PRE PROCEDURE
Department of Anesthesiology  Preprocedure Note       Name:  Victor Manuel Rucker   Age:  80 y.o.  :  1935                                          MRN:  [id number]         Date:  2019      Surgeon: Geneva Moya):  Tonja Suarez MD    Procedure: EGD ESOPHAGOGASTRODUODENOSCOPY (N/A )    Medications prior to admission:   Prior to Admission medications    Medication Sig Start Date End Date Taking?  Authorizing Provider   Cholecalciferol (VITAMIN D) 2000 units TABS tablet Take 2,000 Units by mouth nightly   Yes Historical Provider, MD   ferrous sulfate 325 (65 Fe) MG tablet Take 325 mg by mouth daily   Yes Historical Provider, MD   potassium chloride (KLOR-CON M) 20 MEQ extended release tablet Take 20 mEq by mouth nightly   Yes Historical Provider, MD   furosemide (LASIX) 20 MG tablet Take 20 mg by mouth every morning (before breakfast)   Yes Historical Provider, MD   rivaroxaban (XARELTO) 20 MG TABS tablet Take 20 mg by mouth nightly   Yes Historical Provider, MD   sertraline (ZOLOFT) 100 MG tablet Take 100 mg by mouth daily   Yes Historical Provider, MD   Calcium Carb-Cholecalciferol (CALCIUM-VITAMIN D) 500-200 MG-UNIT per tablet Take 1 tablet by mouth 2 times daily (with meals)   Yes Historical Provider, MD   pantoprazole (PROTONIX) 40 MG tablet Take 40 mg by mouth 2 times daily   Yes Historical Provider, MD   acetaminophen (TYLENOL) 325 MG tablet Take 650 mg by mouth every 4 hours as needed for Pain or Fever   Yes Historical Provider, MD   Menthol, Topical Analgesic, (BIOFREEZE EX) Apply 1 each topically every 8 hours as needed (Pain)   Yes Historical Provider, MD   BIOTENE MOISTURIZING MOUTH (MOUTHKOTE) SOLN Take 5 mLs by mouth every 8 hours as needed (Dry Mouth)   Yes Historical Provider, MD   loperamide (IMODIUM) 2 MG capsule Take 2 mg by mouth 4 times daily as needed for Diarrhea   Yes Historical Provider, MD   levothyroxine (SYNTHROID) 75 MCG tablet Take 1 tablet by mouth Daily  Patient taking differently: Take 75 mcg by mouth every morning (before breakfast)  6/20/16  Yes Michael Mcduffie, DO   traMADol (ULTRAM) 50 MG tablet Take 1 tablet by mouth every 8 hours as needed for Pain  Patient taking differently: Take 50 mg by mouth every 4 hours as needed for Pain.  5/21/16  Yes Junior Casillas, DO   Omega-3 Fatty Acids (FISH OIL) 1000 MG CAPS Take 1,000 mg by mouth daily. Yes Historical Provider, MD   amlodipine-benazepril (LOTREL) 5-10 MG per capsule Take 1 capsule by mouth daily.      Yes Historical Provider, MD   bisacodyl (DULCOLAX) 10 MG suppository Place 10 mg rectally daily as needed for Constipation    Historical Provider, MD   docusate sodium (COLACE) 100 MG capsule Take 100 mg by mouth daily as needed for Constipation    Historical Provider, MD   magnesium hydroxide (MILK OF MAGNESIA) 400 MG/5ML suspension Take 30 mLs by mouth daily as needed for Constipation    Historical Provider, MD       Current medications:    Current Facility-Administered Medications   Medication Dose Route Frequency Provider Last Rate Last Dose    acetaminophen (TYLENOL) tablet 650 mg  650 mg Oral Q6H PRN Demi Donohue MD        bisacodyl (DULCOLAX) suppository 10 mg  10 mg Rectal Daily PRN Demi Donohue MD        calcium-vitamin D 500-200 MG-UNIT per tablet 1 tablet  1 tablet Oral BID WC Demi Donohue MD        vitamin D (CHOLECALCIFEROL) tablet 2,000 Units  2,000 Units Oral Nightly Demi Donohue MD        docusate sodium (COLACE) capsule 100 mg  100 mg Oral Daily PRN Demi Donohue MD        furosemide (LASIX) tablet 20 mg  20 mg Oral QAM AC Demi Donohue MD        levothyroxine (SYNTHROID) tablet 75 mcg  75 mcg Oral Daily Demi Donohue MD        loperamide (IMODIUM) capsule 2 mg  2 mg Oral 4x Daily PRN Demi Donohue MD        magnesium hydroxide (MILK OF MAGNESIA) 400 MG/5ML suspension 30 mL  30 mL Oral Daily PRN Demi Donohue MD        pantoprazole (PROTONIX) tablet 40 mg  40 mg Oral BID Dennise Torres MD        potassium chloride (KLOR-CON M) extended release tablet 20 mEq  20 mEq Oral Nightly Dennise Torres MD        rivaroxaban (XARELTO) tablet 20 mg  20 mg Oral Daily Dennise Torres MD        sertraline (ZOLOFT) tablet 100 mg  100 mg Oral Daily Dennise Torres MD        traMADol Raven Deangelo) tablet 50 mg  50 mg Oral Q8H PRN Dennise Torres MD        ondansetron [facility]) injection 4 mg  4 mg Intravenous Q6H PRN Dennise Torres MD        amLODIPine (NORVASC) tablet 5 mg  5 mg Oral Daily Dennise Torres MD        And    benazepril (LOTENSIN) tablet 10 mg  10 mg Oral Daily Dennise Torres MD        BIOTENE MOISTURIZING MOUTH (MOUTHKOTE) SOLN   Oral Q8H PRN Dennise Torres MD        0.9 % sodium chloride infusion   Intravenous Continuous Dennise Torres MD 75 mL/hr at 05/09/19 0829      metronidazole (FLAGYL) 500 mg in NaCl 100 mL IVPB premix  500 mg Intravenous Paula Nicole MD   Stopped at 05/09/19 1604    ciprofloxacin (CIPRO) IVPB 400 mg  400 mg Intravenous Q12H Dennise Torres MD   Stopped at 05/09/19 1504    sodium chloride flush 0.9 % injection 10 mL  10 mL Intravenous 2 times per day Dennise Torres MD   10 mL at 05/09/19 0829    sodium chloride flush 0.9 % injection 10 mL  10 mL Intravenous PRN Dennise Torres MD         Facility-Administered Medications Ordered in Other Encounters   Medication Dose Route Frequency Provider Last Rate Last Dose    lactated ringers infusion    Continuous PRN MERT Cancino - CRNA           Allergies: Allergies   Allergen Reactions    Codeine Other (See Comments)     Hallucinates.     Demerol Hives    Adhesive Tape Rash       Problem List:    Patient Active Problem List   Diagnosis Code    Osteoporosis M81.0    Vertigo R42    Acquired hypothyroidism E03.9    CVA, old, hemiparesis ([facility] [address] I69.359    Hyperlipidemia E78.5    Hypertension I10    Cerebral artery occlusion with cerebral infarction ([facility] [address] I63.50    hyperlipidemia      ECG reviewed  Rhythm: regular  Rate: normal           Beta Blocker:  Not on Beta Blocker      ROS comment: EKG: Normal Sinus Rhythm 71. Neuro/Psych:   (+) CVA (With Hemiparesis.): residual symptoms, psychiatric history:             ROS comment: Osteoporosis. Muscle weakness. GI/Hepatic/Renal:   (+) GERD:, renal disease:,           Endo/Other:    (+) hypothyroidism: arthritis:., .                  ROS comment: Cancer Uterus. Abdominal:         (-) obese     Vascular:                                      Anesthesia Plan      MAC     ASA 3       Induction: intravenous. Anesthetic plan and risks discussed with patient. Plan discussed with CRNA. Alec Torres MD   5/8/2019    DOS STAFF ADDENDUM:    Pt seen and examined, chart reviewed (including anesthesia, drug and allergy history). Anesthetic plan, risks, benefits, alternatives, and personnel involved discussed with patient. Patient verbalized an understanding and agrees to proceed. Plan discussed with care team members and agreed upon.     Lino Vela MD  Staff Anesthesiologist  4:32 PM

## 2019-05-09 NOTE — H&P
H & P REVIEWED  Blood pressure 119/61, pulse 61, temperature 98.7 °F (37.1 °C), temperature source Oral, resp. rate 18, height 5' 5\" (1.651 m), weight 148 lb 8 oz (67.4 kg), SpO2 95 %, not currently breastfeeding.

## 2019-05-09 NOTE — PROGRESS NOTES
Pharmacy Note    Marcial Tadks was ordered Fish Oil 1000mg. Per the [address], this medication is non-formulary and not stocked by pharmacy for the reason indicated below. The medication can be reordered at discharge. KELBY Garcia Ph. 5/9/20197:31 AM

## 2019-05-09 NOTE — CONSULTS
Cardiology consult  Dr. Negro Schmidt      Reason for Consult: Chest Pain   Requesting Physician: Lyn Bustamante MD  CHIEF COMPLAINT: Chest Pain   History Obtained From: patient, electronic medical record  HISTORY OF PRESENT ILLNESS:   Patient is a 80year old female with a medical history of Atrial Fibrillation, Hypertension, Hyperlipidemia, Thyroid Disease, GERD, History of CVA, and COPD. Patient reports that she developed a sudden onset of lower abdominal pain. She did not attempt any treatment for this, she reports that is lasted a few days and was consistent, this prompted her to seek further medical attention. She resides at long term care facility. Patient denies any chest pain, no shortness of breath, no lightheadedness, no dizziness, no palpitations, no pedal edema, no PND, no orthopnea, no syncope, no presyncopal episodes.         Past Medical History:   Diagnosis Date    Acquired hypothyroidism     Arthritis     Blood transfusion     Cancer ([facility] [address]     Uterine    COPD (chronic obstructive pulmonary disease) [facility] [address]     CVA, old, hemiparesis ([facility] [address]     Depression     GERD (gastroesophageal reflux disease)     Hyperlipidemia     Hypertension     Muscle weakness     Osteoporosis     Other disorders of kidney and ureter     Paroxysmal atrial fibrillation (HCC)     Thyroid disease     Unspecified cerebral artery occlusion with cerebral infarction     At age 39, residual left side weakness       Past Surgical History:   Procedure Laterality Date    ABDOMEN SURGERY      APPENDECTOMY      BACK SURGERY  6/17/16    kypho T12 and L3 with bone biopsies    BRAIN SURGERY      COLON SURGERY      COLONOSCOPY      ENDOSCOPY, COLON, DIAGNOSTIC      FIXATION KYPHOPLASTY  09/28/2017    L2, L4, L5 with Bone Biopsy & Epidural Injection    TONSILLECTOMY           Current Facility-Administered Medications:     acetaminophen (TYLENOL) tablet 650 mg, 650 mg, Oral, Q6H PRN, Lyn Bustamante MD    bisacodyl (DULCOLAX) suppository 10 mg, 10 mg, Rectal, Daily PRN, Angela Roman MD    calcium-vitamin D 500-200 MG-UNIT per tablet 1 tablet, 1 tablet, Oral, BID WC, Angela Roman MD    vitamin D (CHOLECALCIFEROL) tablet 2,000 Units, 2,000 Units, Oral, Nightly, Angela Roman MD    docusate sodium (COLACE) capsule 100 mg, 100 mg, Oral, Daily PRN, Angela Roman MD    furosemide (LASIX) tablet 20 mg, 20 mg, Oral, QAM AC, Angela Roman MD    levothyroxine (SYNTHROID) tablet 75 mcg, 75 mcg, Oral, Daily, Angela Roman MD    loperamide (IMODIUM) capsule 2 mg, 2 mg, Oral, 4x Daily PRN, Angela Roman MD    magnesium hydroxide (MILK OF MAGNESIA) 400 MG/5ML suspension 30 mL, 30 mL, Oral, Daily PRN, Angela Roman MD    pantoprazole (PROTONIX) tablet 40 mg, 40 mg, Oral, BID, Angela Roman MD    potassium chloride (KLOR-CON M) extended release tablet 20 mEq, 20 mEq, Oral, Nightly, Angela Roman MD    rivaroxaban (XARELTO) tablet 20 mg, 20 mg, Oral, Daily, Angela Roman MD    sertraline (ZOLOFT) tablet 100 mg, 100 mg, Oral, Daily, Angela Roman MD    traMADol (ULTRAM) tablet 50 mg, 50 mg, Oral, Q8H PRN, Angela Roman MD    ondansetron (ZOFRAN) injection 4 mg, 4 mg, Intravenous, Q6H PRN, Angela Roman MD    amLODIPine (NORVASC) tablet 5 mg, 5 mg, Oral, Daily **AND** benazepril (LOTENSIN) tablet 10 mg, 10 mg, Oral, Daily, Angela Roman MD    BIOTENE MOISTURIZING MOUTH (MOUTHKOTE) SOLN, , Oral, Q8H PRN, Angela Roman MD    0.9 % sodium chloride infusion, , Intravenous, Continuous, Angela Roman MD, Last Rate: 75 mL/hr at 05/09/19 0829    metronidazole (FLAGYL) 500 mg in NaCl 100 mL IVPB premix, 500 mg, Intravenous, Q6H, Angela Roman MD, Stopped at 05/09/19 1055    ciprofloxacin (CIPRO) IVPB 400 mg, 400 mg, Intravenous, Q12H, Angela Roman MD, Last Rate: 200 mL/hr at 05/09/19 1300, 400 mg at 05/09/19 1300    sodium chloride flush 0.9 % injection 10 mL, 10 mL, Intravenous, 2 times per day, Demi Donohue MD, 10 mL at 05/09/19 9030    sodium chloride flush 0.9 % injection 10 mL, 10 mL, Intravenous, PRN, Demi Donohue MD    Allergies as of 05/08/2019 - Review Complete 05/08/2019   Allergen Reaction Noted    Codeine Other (See Comments) 10/30/2011    Demerol Hives 10/30/2011    Adhesive tape Rash 10/30/2011       Social History     Socioeconomic History    Marital status:       Spouse name: Not on file    Number of children: Not on file    Years of education: Not on file    Highest education level: Not on file   Occupational History    Not on file   Social Needs    Financial resource strain: Not on file    Food insecurity:     Worry: Not on file     Inability: Not on file    Transportation needs:     Medical: Not on file     Non-medical: Not on file   Tobacco Use    Smoking status: Never Smoker    Smokeless tobacco: Never Used   Substance and Sexual Activity    Alcohol use: No    Drug use: Not on file    Sexual activity: Not on file   Lifestyle    Physical activity:     Days per week: Not on file     Minutes per session: Not on file    Stress: Not on file   Relationships    Social connections:     Talks on phone: Not on file     Gets together: Not on file     Attends [employer] service: Not on file     Active member of club or organization: Not on file     Attends meetings of clubs or organizations: Not on file     Relationship status: Not on file    Intimate partner violence:     Fear of current or ex partner: Not on file     Emotionally abused: Not on file     Physically abused: Not on file     Forced sexual activity: Not on file   Other Topics Concern    Not on file   Social History Narrative    Not on file       Family History   Problem Relation Age of Onset    Other Mother     Heart Disease Father     Cancer Sister     Depression Brother     COPD Brother     Cancer Brother        REVIEW OF SYSTEMS:     CONSTITUTIONAL:  negative for fevers, chills, sweats and + fatigue  EYES:  negative for  double vision, blurred vision and blind spots  HEENT:  negative for  tinnitus, earaches, nasal congestion and epistaxis  RESPIRATORY:  negative for  dry cough, cough with sputum, dyspnea, wheezing and hemoptysis  CARDIOVASCULAR: as per HPI  GASTROINTESTINAL:  negative for nausea, vomiting, diarrhea, constipation, pruritus and jaundice  GENITOURINARY:  negative for frequency, dysuria, nocturia, urinary incontinence and hesitancy  HEMATOLOGIC/LYMPHATIC:  negative for easy bruising, bleeding, lymphadenopathy and petechiae  ALLERGIC/IMMUNOLOGIC:  negative for urticaria, hay fever and angioedema  ENDOCRINE:  negative for heat intolerance, cold intolerance, tremor, hair loss and diabetic symptoms including neither polyuria nor polydipsia nor blurred vision  MUSCULOSKELETAL:  negative for  myalgias, arthralgias, joint swelling, stiff joints and + left decreased range of motion  NEUROLOGICAL:  negative for memory problems, speech problems, visual disturbance, dysphagia, weakness and numbness      PHYSICAL EXAM:   CONSTITUTIONAL:  awake, alert, cooperative, no apparent distress, and appears stated age  EYES:  lids and lashes normal and pupils equal, round and reactive to light, anicteric sclerae  HEAD:  normocepalic, without obvious abnormality, atraumatic, pink, moist mucous membranes. NECK:  Supple, symmetrical, trachea midline, no adenopathy, thyroid symmetric, not enlarged and no tenderness, skin normal  HEMATOLOGIC/LYMPHATICS:  no cervical lymphadenopathy and no supraclavicular lymphadenopathy  LUNGS:  No increased work of breathing, good air exchange, clear to auscultation bilaterally, no crackles or wheezing  CARDIOVASCULAR:  Normal apical impulse, regular rate and rhythm, normal S1 and S2, no S3 or S4, and no murmur noted and no JVD, no carotid bruit, no pedal edema, good carotid upstroke bilaterally.   ABDOMEN:  Soft, nontender, no masses, no hepatomegaly or splenomegaly, BS+  CHEST: nontender to palpation, expands symmetrically  MUSCULOSKELETAL:  No clubbing no cyanosis. there is no redness, warmth, or swelling of the joints  full range of motion noted  NEUROLOGIC:  Alert, awake,oriented x3   SKIN:  no bruising or bleeding, normal skin color, texture, turgor and no redness, warmth, or swelling    /61   Pulse 61   Temp 98.7 °F (37.1 °C) (Oral)   Resp 18   Ht 5' 5\" (1.651 m)   Wt 148 lb 8 oz (67.4 kg)   SpO2 97%   BMI 24.71 kg/m²     DATA:   I personally reviewed the admission EKG with the following interpretation: Sinus rhythm     ECHO: Not performed to date    Stress Test: Not performed to date  Angiography: Not performed to date    Cardiology Labs:   BMP:    Lab Results   Component Value Date     05/08/2019    K 4.2 05/08/2019     05/08/2019    CO2 28 05/08/2019    BUN 19 05/08/2019     CMP:    Lab Results   Component Value Date     05/08/2019    K 4.2 05/08/2019     05/08/2019    CO2 28 05/08/2019    BUN 19 05/08/2019    PROT 6.4 05/08/2019     CBC:    Lab Results   Component Value Date    WBC 11.7 05/08/2019    RBC 3.83 05/08/2019    HGB 11.9 05/08/2019    HCT 36.7 05/08/2019    MCV 95.8 05/08/2019    RDW 13.8 05/08/2019     05/08/2019     PT/INR:  No results found for: PTINR  PT/INR Warfarin:  No components found for: PTPATWAR, PTINRWAR  PTT:    Lab Results   Component Value Date    APTT 41.6 05/21/2016     PTT Heparin:  No components found for: APTTHEP  Magnesium:    Lab Results   Component Value Date    MG 2.3 01/26/2015     TSH:    Lab Results   Component Value Date    TSH 5.830 06/15/2016     TROPONIN:  No components found for: TROP  BNP:  No results found for: BNP  FASTING LIPID PANEL:    Lab Results   Component Value Date    CHOL 136 06/15/2016    HDL 53 06/15/2016    TRIG 102 06/15/2016     CT ABDOMEN PELVIS W IV CONTRAST Additional Contrast? None   Final Result   1.  Distended loops of small bowel, without definite transition point,   ileus versus early obstruction. 2. Cholelithiasis. XR CHEST PORTABLE   Final Result   No acute cardiopulmonary disease. I have personally reviewed the laboratory, cardiac diagnostic and radiographic testing as outlined above: An 80years old female with history of atrial fibrillation, hypertension, hyperlipidemia, was admitted to the hospital with chest pain and hematemesis:  IMPRESSION:  1. Chest Pain: Atypical. Troponin negative x2. Non EKG abnormalities, doubt cardiac, and patient and her daughter reported that they are not interested in any further cardiac testing even if needed, have opted for medical therapy only. Will continue current treatment and monitor. 2. Atrial Fibrillation: Paroxsymal. Currently SR. Chronic anticoagulation with Xarelto   3. Hypertension   4. Hyperlipidemia   5. Thyroid Disease  6. GERD   7. History of CVA: with Left sided weakness   8. COPD   9. Anemia: Etiology? For EGD today       RECOMMENDATIONS:   1. Will continue current treatment   2. Increase ambulation as tolerated   3. Intake and Output    4. Further cardiac recommendations forthcoming pending patients clinical progression and diagnostic test findings       I have reviewed my findings and recommendations with patient and her daughter at bedside   Discussed with Dr. Alexander Delgado     Thank you for the consult! Electronically signed by MERT Hirsch CNP on 5/9/2019 at 2:23 PM  I have discussed the care of patient including pertinent history and exam and reviewed chart, vitals, labs and radiologic studies. I also participated in medical decision making with Lokesh Pantoja CNP on the date of service and I agree with all of the pertinent clinical information, assessment and treatment plan and status of the problem list as documented and have edited it. NOTE: This report was transcribed using voice recognition software.  Every effort was made to ensure accuracy;

## 2019-05-09 NOTE — ANESTHESIA POSTPROCEDURE EVALUATION
Department of Anesthesiology  Postprocedure Note    Patient: Tiffany Amador  MRN: [id number]  YOB: 1935  Date of evaluation: 5/9/2019  Time:  5:40 PM     Procedure Summary     Date:  05/09/19 Room / Location:  [place] [address] / [facility] ENDOSCOPY    Anesthesia Start:  1629 Anesthesia Stop:  2562    Procedure:  EGD BIOPSY (N/A ) Diagnosis:  (GI BLEED)    Surgeon:  Mahogany De Los Santos MD Responsible Provider:  Hanna Rosario MD    Anesthesia Type:  MAC ASA Status:  3          Anesthesia Type: MAC    Kandice Phase I:      Kandice Phase II: Kandice Score: 10    Last vitals: Reviewed and per EMR flowsheets.        Anesthesia Post Evaluation    Patient location during evaluation: PACU  Patient participation: complete - patient participated  Level of consciousness: awake and alert  Airway patency: patent  Nausea & Vomiting: no nausea and no vomiting  Complications: no  Cardiovascular status: hemodynamically stable  Respiratory status: acceptable  Hydration status: euvolemic

## 2019-05-10 LAB
ALBUMIN SERPL-MCNC: 2.8 G/DL (ref 3.5–5.2)
ALP BLD-CCNC: 64 U/L (ref 35–104)
ALT SERPL-CCNC: 10 U/L (ref 0–32)
ANION GAP SERPL CALCULATED.3IONS-SCNC: 13 MMOL/L (ref 7–16)
AST SERPL-CCNC: 14 U/L (ref 0–31)
BASOPHILS ABSOLUTE: 0.05 E9/L (ref 0–0.2)
BASOPHILS RELATIVE PERCENT: 0.8 % (ref 0–2)
BILIRUB SERPL-MCNC: <0.2 MG/DL (ref 0–1.2)
BUN BLDV-MCNC: 12 MG/DL (ref 8–23)
CALCIUM SERPL-MCNC: 8.6 MG/DL (ref 8.6–10.2)
CHLORIDE BLD-SCNC: 107 MMOL/L (ref 98–107)
CO2: 20 MMOL/L (ref 22–29)
CREAT SERPL-MCNC: 0.8 MG/DL (ref 0.5–1)
EOSINOPHILS ABSOLUTE: 0.13 E9/L (ref 0.05–0.5)
EOSINOPHILS RELATIVE PERCENT: 2 % (ref 0–6)
GFR AFRICAN AMERICAN: >60
GFR NON-AFRICAN AMERICAN: >60 ML/MIN/1.73
GLUCOSE BLD-MCNC: 135 MG/DL (ref 74–99)
HCT VFR BLD CALC: 30.8 % (ref 34–48)
HEMOGLOBIN: 10 G/DL (ref 11.5–15.5)
IMMATURE GRANULOCYTES #: 0.01 E9/L
IMMATURE GRANULOCYTES %: 0.2 % (ref 0–5)
LYMPHOCYTES ABSOLUTE: 1.25 E9/L (ref 1.5–4)
LYMPHOCYTES RELATIVE PERCENT: 18.9 % (ref 20–42)
MCH RBC QN AUTO: 31.3 PG (ref 26–35)
MCHC RBC AUTO-ENTMCNC: 32.5 % (ref 32–34.5)
MCV RBC AUTO: 96.3 FL (ref 80–99.9)
METER GLUCOSE: 86 MG/DL (ref 74–99)
MONOCYTES ABSOLUTE: 0.58 E9/L (ref 0.1–0.95)
MONOCYTES RELATIVE PERCENT: 8.8 % (ref 2–12)
NEUTROPHILS ABSOLUTE: 4.58 E9/L (ref 1.8–7.3)
NEUTROPHILS RELATIVE PERCENT: 69.3 % (ref 43–80)
PDW BLD-RTO: 13.5 FL (ref 11.5–15)
PLATELET # BLD: 194 E9/L (ref 130–450)
PMV BLD AUTO: 9.6 FL (ref 7–12)
POTASSIUM SERPL-SCNC: 3.5 MMOL/L (ref 3.5–5)
RBC # BLD: 3.2 E12/L (ref 3.5–5.5)
SODIUM BLD-SCNC: 140 MMOL/L (ref 132–146)
TOTAL PROTEIN: 5.3 G/DL (ref 6.4–8.3)
WBC # BLD: 6.6 E9/L (ref 4.5–11.5)

## 2019-05-10 PROCEDURE — 6370000000 HC RX 637 (ALT 250 FOR IP): Performed by: INTERNAL MEDICINE

## 2019-05-10 PROCEDURE — 2500000003 HC RX 250 WO HCPCS: Performed by: INTERNAL MEDICINE

## 2019-05-10 PROCEDURE — 85025 COMPLETE CBC W/AUTO DIFF WBC: CPT

## 2019-05-10 PROCEDURE — 36415 COLL VENOUS BLD VENIPUNCTURE: CPT

## 2019-05-10 PROCEDURE — 6360000002 HC RX W HCPCS: Performed by: INTERNAL MEDICINE

## 2019-05-10 PROCEDURE — 1200000000 HC SEMI PRIVATE

## 2019-05-10 PROCEDURE — 82962 GLUCOSE BLOOD TEST: CPT

## 2019-05-10 PROCEDURE — 80053 COMPREHEN METABOLIC PANEL: CPT

## 2019-05-10 RX ORDER — METRONIDAZOLE 500 MG/1
500 TABLET ORAL EVERY 8 HOURS SCHEDULED
Status: DISCONTINUED | OUTPATIENT
Start: 2019-05-10 | End: 2019-05-11 | Stop reason: HOSPADM

## 2019-05-10 RX ORDER — CIPROFLOXACIN 500 MG/1
500 TABLET, FILM COATED ORAL EVERY 12 HOURS SCHEDULED
Status: DISCONTINUED | OUTPATIENT
Start: 2019-05-10 | End: 2019-05-11 | Stop reason: HOSPADM

## 2019-05-10 RX ADMIN — AMLODIPINE BESYLATE 5 MG: 5 TABLET ORAL at 09:56

## 2019-05-10 RX ADMIN — METRONIDAZOLE 500 MG: 500 INJECTION, SOLUTION INTRAVENOUS at 01:13

## 2019-05-10 RX ADMIN — CIPROFLOXACIN HYDROCHLORIDE 500 MG: 500 TABLET, FILM COATED ORAL at 21:55

## 2019-05-10 RX ADMIN — PANTOPRAZOLE SODIUM 40 MG: 40 TABLET, DELAYED RELEASE ORAL at 21:55

## 2019-05-10 RX ADMIN — SERTRALINE HYDROCHLORIDE 100 MG: 50 TABLET ORAL at 09:56

## 2019-05-10 RX ADMIN — METRONIDAZOLE 500 MG: 500 TABLET ORAL at 21:55

## 2019-05-10 RX ADMIN — POTASSIUM CHLORIDE 20 MEQ: 20 TABLET, EXTENDED RELEASE ORAL at 21:55

## 2019-05-10 RX ADMIN — OYSTER SHELL CALCIUM WITH VITAMIN D 1 TABLET: 500; 200 TABLET, FILM COATED ORAL at 17:22

## 2019-05-10 RX ADMIN — OYSTER SHELL CALCIUM WITH VITAMIN D 1 TABLET: 500; 200 TABLET, FILM COATED ORAL at 09:56

## 2019-05-10 RX ADMIN — PANTOPRAZOLE SODIUM 40 MG: 40 TABLET, DELAYED RELEASE ORAL at 09:56

## 2019-05-10 RX ADMIN — RIVAROXABAN 20 MG: 20 TABLET, FILM COATED ORAL at 17:22

## 2019-05-10 RX ADMIN — VITAMIN D 2000 UNITS: 25 TAB ORAL at 21:55

## 2019-05-10 RX ADMIN — BENAZEPRIL HYDROCHLORIDE 10 MG: 10 TABLET ORAL at 09:56

## 2019-05-10 RX ADMIN — FUROSEMIDE 20 MG: 20 TABLET ORAL at 05:43

## 2019-05-10 RX ADMIN — ONDANSETRON 4 MG: 2 INJECTION INTRAMUSCULAR; INTRAVENOUS at 02:38

## 2019-05-10 RX ADMIN — CIPROFLOXACIN 400 MG: 2 INJECTION, SOLUTION INTRAVENOUS at 00:07

## 2019-05-10 RX ADMIN — LEVOTHYROXINE SODIUM 75 MCG: 75 TABLET ORAL at 05:43

## 2019-05-10 ASSESSMENT — PAIN SCALES - GENERAL
PAINLEVEL_OUTOF10: 0
PAINLEVEL_OUTOF10: 4

## 2019-05-10 ASSESSMENT — PAIN DESCRIPTION - LOCATION: LOCATION: GENERALIZED

## 2019-05-10 ASSESSMENT — PAIN DESCRIPTION - DESCRIPTORS: DESCRIPTORS: ACHING;DISCOMFORT;SORE

## 2019-05-10 ASSESSMENT — PAIN DESCRIPTION - PAIN TYPE: TYPE: CHRONIC PAIN

## 2019-05-10 ASSESSMENT — PAIN DESCRIPTION - ORIENTATION: ORIENTATION: RIGHT;LOWER;MID

## 2019-05-10 ASSESSMENT — PAIN DESCRIPTION - ONSET: ONSET: ON-GOING

## 2019-05-10 ASSESSMENT — PAIN DESCRIPTION - PROGRESSION: CLINICAL_PROGRESSION: GRADUALLY WORSENING

## 2019-05-10 ASSESSMENT — PAIN DESCRIPTION - FREQUENCY: FREQUENCY: CONTINUOUS

## 2019-05-10 NOTE — PROGRESS NOTES
Patient refused to be turned every 2 hours. Patient educated on the potential problems not turning could have on her body. Will continue to educate patient and  pass the information on to the oncoming nurse.

## 2019-05-10 NOTE — H&P
H&P Note        CHIEF COMPLAINT:  Nausea and vomiting      HISTORY OF PRESENT ILLNESS:      The patient is a 80 y.o. female know to me from NH, she has hx of CVA, on Coumadin, GERD, hyperlipidemia, hypertension, paroxysmal atrial fibrillation, who presents to the ED for abdominal pain, heart burn ,nausea, vomiting, and hematemesis for the last few days. Patient as started on protonix and dose was doubled recently   Patient states that she has had one episode of vomiting yesterday, that was black and coffee ground at that time. She states that she has had worsening abdominal pain that began today. Her pain was diffuse and she was tender on exam on the RLQ AREA.              Past Medical History:    Past Medical History:   Diagnosis Date    Acquired hypothyroidism     Arthritis     Blood transfusion     Cancer ([facility] [address]     Uterine    COPD (chronic obstructive pulmonary disease) ([facility] [address]     CVA, old, hemiparesis ([facility] [address]     Depression     GERD (gastroesophageal reflux disease)     Hyperlipidemia     Hypertension     Muscle weakness     Osteoporosis     Other disorders of kidney and ureter     Paroxysmal atrial fibrillation (HCC)     Thyroid disease     Unspecified cerebral artery occlusion with cerebral infarction     At age 39, residual left side weakness       Past Surgical History:    Past Surgical History:   Procedure Laterality Date    ABDOMEN SURGERY      APPENDECTOMY      BACK SURGERY  6/17/16    kypho T12 and L3 with bone biopsies    BRAIN SURGERY      COLON SURGERY      COLONOSCOPY      ENDOSCOPY, COLON, DIAGNOSTIC      FIXATION KYPHOPLASTY  09/28/2017    L2, L4, L5 with Bone Biopsy & Epidural Injection    TONSILLECTOMY      UPPER GASTROINTESTINAL ENDOSCOPY N/A 5/9/2019    EGD BIOPSY performed by Mahogany De Los Santos MD at [facility] ENDOSCOPY       Medications Prior to Admission:    Prior to Admission medications    Medication Sig Start Date End Date Taking?  Authorizing Provider   Cholecalciferol (VITAMIN D) 2000 units TABS tablet Take 2,000 Units by mouth nightly   Yes Historical Provider, MD   ferrous sulfate 325 (65 Fe) MG tablet Take 325 mg by mouth daily   Yes Historical Provider, MD   potassium chloride (KLOR-CON M) 20 MEQ extended release tablet Take 20 mEq by mouth nightly   Yes Historical Provider, MD   furosemide (LASIX) 20 MG tablet Take 20 mg by mouth every morning (before breakfast)   Yes Historical Provider, MD   rivaroxaban (XARELTO) 20 MG TABS tablet Take 20 mg by mouth nightly   Yes Historical Provider, MD   sertraline (ZOLOFT) 100 MG tablet Take 100 mg by mouth daily   Yes Historical Provider, MD   Calcium Carb-Cholecalciferol (CALCIUM-VITAMIN D) 500-200 MG-UNIT per tablet Take 1 tablet by mouth 2 times daily (with meals)   Yes Historical Provider, MD   pantoprazole (PROTONIX) 40 MG tablet Take 40 mg by mouth 2 times daily   Yes Historical Provider, MD   acetaminophen (TYLENOL) 325 MG tablet Take 650 mg by mouth every 4 hours as needed for Pain or Fever   Yes Historical Provider, MD   Menthol, Topical Analgesic, (BIOFREEZE EX) Apply 1 each topically every 8 hours as needed (Pain)   Yes Historical Provider, MD   BIOTENE MOISTURIZING MOUTH (MOUTHKOTE) SOLN Take 5 mLs by mouth every 8 hours as needed (Dry Mouth)   Yes Historical Provider, MD   loperamide (IMODIUM) 2 MG capsule Take 2 mg by mouth 4 times daily as needed for Diarrhea   Yes Historical Provider, MD   levothyroxine (SYNTHROID) 75 MCG tablet Take 1 tablet by mouth Daily  Patient taking differently: Take 75 mcg by mouth every morning (before breakfast)  6/20/16  Yes Michael Mcduffie, DO   traMADol (ULTRAM) 50 MG tablet Take 1 tablet by mouth every 8 hours as needed for Pain  Patient taking differently: Take 50 mg by mouth every 4 hours as needed for Pain.  5/21/16  Yes Arabella Casillas, DO   Omega-3 Fatty Acids (FISH OIL) 1000 MG CAPS Take 1,000 mg by mouth daily.    Yes Historical Provider, MD   amlodipine-benazepril (LOTREL) 5-10 MG per capsule Take 1 capsule by mouth daily. Yes Historical Provider, MD   bisacodyl (DULCOLAX) 10 MG suppository Place 10 mg rectally daily as needed for Constipation    Historical Provider, MD   docusate sodium (COLACE) 100 MG capsule Take 100 mg by mouth daily as needed for Constipation    Historical Provider, MD   magnesium hydroxide (MILK OF MAGNESIA) 400 MG/5ML suspension Take 30 mLs by mouth daily as needed for Constipation    Historical Provider, MD       Allergies:    Codeine; Demerol; and Adhesive tape    Social History:   Social History     Socioeconomic History    Marital status:       Spouse name: Not on file    Number of children: Not on file    Years of education: Not on file    Highest education level: Not on file   Occupational History    Not on file   Social Needs    Financial resource strain: Not on file    Food insecurity:     Worry: Not on file     Inability: Not on file    Transportation needs:     Medical: Not on file     Non-medical: Not on file   Tobacco Use    Smoking status: Never Smoker    Smokeless tobacco: Never Used   Substance and Sexual Activity    Alcohol use: No    Drug use: Not on file    Sexual activity: Not on file   Lifestyle    Physical activity:     Days per week: Not on file     Minutes per session: Not on file    Stress: Not on file   Relationships    Social connections:     Talks on phone: Not on file     Gets together: Not on file     Attends [employer] service: Not on file     Active member of club or organization: Not on file     Attends meetings of clubs or organizations: Not on file     Relationship status: Not on file    Intimate partner violence:     Fear of current or ex partner: Not on file     Emotionally abused: Not on file     Physically abused: Not on file     Forced sexual activity: Not on file   Other Topics Concern    Not on file   Social History Narrative    Not on file       Family History:   Family History   Problem Relation Age of Onset    Other Mother     Heart Disease Father     Cancer Sister     Depression Brother     COPD Brother     Cancer Brother        REVIEW OF SYSTEMS:    General:  No fever or chills. No lightheadedness or dizziness  Cardiovascular: Denies any chest pain, irregular heartbeats, or palpitations. Respiratory: Denies shortness of breath, coughing, sputum production, hemoptysis, or wheezing. Gastrointestinal:  ABD PAIN AND GERD  Extremities: Denies any lower extremity swelling or edema. Neurology: LT TAMEKA  Derm:  Denies any rashes, ulcers, or excoriations. Denies bruising. Genitourinary:  Denies any urgency, frequency, hematuria. Voiding without difficulty. Musculoskeletal:  Denies any myalgia or arthralgia. No back pain. PHYSICAL EXAM:    Vitals:  /60   Pulse 64   Temp 97.4 °F (36.3 °C) (Oral)   Resp 16   Ht 5' 5\" (1.651 m)   Wt 148 lb 8 oz (67.4 kg)   SpO2 97%   BMI 24.71 kg/m²     General:  This is a 80 y.o. yo female who is alert and oriented in NAD  HEENT:  Head is normocephalic and atraumatic, PERRLA, EOMI, mucus membranes moist with no pharyngeal erythema or exudate. Neck:  Supple with no carotid bruits, JVD or thyromegaly. No cervical adenopathy  CV:  iregular rate and rhythm, no murmurs  Lungs:  Clear to auscultation bilaterally with no wheezes, rales or rhonchi  Abdomen:  RLQ AND TENDERNESS , NO REBOUND  Extremities:  No edema, peripheral pulses intact bilaterally  Neuro:  Cranial nerves II-XII grossly intact; LT TAMEKA  Skin:  No rashes, lesions or wounds  Osteopathic Structural:  Patient examined in the seated and supine positions. Normal lordosis and kyphosis of the spine with no acute tissue texture abnormalities. ASSESSMENT:    Active Problems:    Right lower quadrant abdominal pain    GI bleed  Resolved Problems:    * No resolved hospital problems.  *    Acquired hypothyroidism    Arthritis    Blood transfusion    Cancer ([facility] [address]    Uterine   COPD

## 2019-05-10 NOTE — PLAN OF CARE
Problem: Falls - Risk of:  Goal: Will remain free from falls  Description  Will remain free from falls  5/10/2019 0401 by James Handley RN  Outcome: Met This Shift  5/9/2019 2237 by Delmy Larry RN  Outcome: Met This Shift  Goal: Absence of physical injury  Description  Absence of physical injury  5/10/2019 0401 by James Handley RN  Outcome: Met This Shift  5/9/2019 2237 by Delmy Larry RN  Outcome: Met This Shift     Problem: Risk for Impaired Skin Integrity  Goal: Tissue integrity - skin and mucous membranes  Description  Structural intactness and normal physiological function of skin and  mucous membranes.   5/10/2019 0401 by James Handley RN  Outcome: Met This Shift  5/9/2019 2237 by Delmy Larry RN  Outcome: Met This Shift

## 2019-05-10 NOTE — PROGRESS NOTES
Patient educated on the need for lab work to be done before she can be discharged. Patient did okay the  coming again to attempt getting CBC and CMP.

## 2019-05-10 NOTE — CONSULTS
[address]                                  CONSULTATION    PATIENT NAME: Macie Messina                   :        1935  MED REC NO:   [id number]                            ROOM:       0424  ACCOUNT NO:   [de-identified]                           ADMIT DATE: 2019  PROVIDER:     Jacquelyn Gaytanr    CONSULT DATE:  2019    PMD:  Dr. Kailash Saldivar. REASON FOR CONSULTATION:  Coffee-ground emesis and melena with anemia. HISTORY OF PRESENT ILLNESS:  The patient is an 55-year-old female known  to have past medical history of stroke, afebrile on Coumadin, GERD, high  blood pressure, and hyperlipidemia. Admitted to the hospital with  epigastric, right upper quadrant pain with nausea and coffee-ground  emesis. She reports also having melena at home for the last day or two. Pain much improved today after she was placed on Protonix drip. She  denies any hematochezia. She is on Coumadin at home. Her INR was not  therapeutic. Last time, I scoped her as an outpatient in . Last  time, she had endoscopy by Dr. Marco Aleman was in 2019. The patient in  hospital, blood work revealed that her hemoglobin was 11.9 and  hematocrit 36.7 yesterday. She has some chest pain today. Troponin was  negative twice. Per cardiology note, she declined any further workup,  requested only medical treatment. The patient reports, she takes NSAIDs  at home occasionally once or twice a week. PAST MEDICAL HISTORY:  As above. Thyroid disease, depression, and COPD. PAST SURGICAL HISTORY:  Hysterectomy for uterine cancer and  appendectomy, tonsillectomy, endoscopy, colonoscopy, kyphoplasty, and  back surgery. FAMILY HISTORY:  Brother had COPD, sister had cancer and brother had  cancer. SOCIAL HISTORY:  She denies smoking, alcohol, or drug abuse. ALLERGIES:  CODEINE and ADHESIVE TAPE.     REVIEW OF SYSTEMS:  All systems reviewed unrevealing except per history. PHYSICAL EXAMINATION:  GENERAL:  Awake, oriented x3, in no acute distress. VITAL SIGNS:  Blood pressure 119/61, pulse 61, respiration 18, and  temperature 98.7. HEENT:  Normocephalic, atraumatic. Pupils are equal, reactive to light,  extraocular muscles intact. Conjunctivae injected. Sclerae anicteric. NECK:  Supple. No palpable cervical lymphadenopathy. No palpable  thyromegaly. HEART:  S1, S2. No murmur, no gallop. LUNGS:  Clear to air bilaterally. No wheeze, no crackles. ABDOMEN:  Soft, positive bowel sounds. No rebound, mild right upper  quadrant tenderness. EXTREMITIES:  No edema, positive pulse. SKIN:  No ecchymosis, no cyanosis, and no clubbing. LABORATORY DATA:  Sodium 140, potassium 4.2, BUN 19, creatinine 0.8, AST  0.3, ALT 16, alkaline phos 79, H and H 11.9, and 36.7, WBC 11.7, and INR  1.1 with PT 12.7. CT of the abdomen and pelvis performed yesterday revealed, distended  loop of small bowel without definitive transition point, ileus versus  large bowel obstruction. Cholelithiasis is also noted. ASSESSMENT AND PLAN:  An 80-year-old female admitted to the hospital  with coffee-ground emesis, melena, and right upper quadrant pain, being  on NSAID at home occasional and Coumadin, rule out underlying PUD,  gastritis, esophagitis, NSAID gastropathy. The patient already started  on Protonix drip, pain much improved as she reported and nausea and  vomiting subsided. Endoscopy is warranted at this time to rule out  above-mentioned condition. Further recommendation pending endoscopy  results. Thank you for consultation in taking care of your patient. Please feel  free to call me if you have any questions.         Fanny Luis    D: 05/09/2019 16:44:50       T: 05/09/2019 21:21:01     [email or web address]  Job#: [id number]     Doc#: [id number]    CC:

## 2019-05-10 NOTE — PROGRESS NOTES
[facility] Quality Flow/Interdisciplinary Rounds Progress Note        Quality Flow Rounds held on May 10, 2019    Disciplines Attending:  Bedside Nurse, ,  and Nursing Unit Leadership    Toby Jackson was admitted on 5/8/2019  2:09 PM    Anticipated Discharge Date:  Expected Discharge Date: 05/11/19    Disposition:    Gonzalez Score:  Gonzalez Scale Score: 17    Readmission Risk              Risk of Unplanned Readmission:        12           Discussed patient goal for the day, patient clinical progression, and barriers to discharge.   The following Goal(s) of the Day/Commitment(s) have been identified:  IV ATX, activity progression, need PT/OT, IV F      Fly Myers  May 10, 2019

## 2019-05-10 NOTE — PROGRESS NOTES
IV in right upper arm infiltrated and attempted to once to re-insert IV but patient refused a second attempt.   Patient would like IV antibiotics to be made oral.  Will notify Dr. Awad Section of patient's request.

## 2019-05-10 NOTE — PLAN OF CARE
Problem: Falls - Risk of:  Goal: Will remain free from falls  Description  Will remain free from falls  5/10/2019 0906 by Mirela Macias RN  Outcome: Met This Shift    Problem: Falls - Risk of:  Goal: Absence of physical injury  Description  Absence of physical injury  5/10/2019 0906 by Mirela Macias RN  Outcome: Met This Shift     Problem: Risk for Impaired Skin Integrity  Goal: Tissue integrity - skin and mucous membranes  Description  Structural intactness and normal physiological function of skin and  mucous membranes.   5/10/2019 0906 by Mirela Macias RN  Outcome: Met This Shift

## 2019-05-10 NOTE — PROGRESS NOTES
[id number] Nicole Messer for discharge. Ok to increase diet. Per dr Baez See. Dr. Samuel Engle will be in later to see patient.

## 2019-05-11 VITALS
WEIGHT: 154.3 LBS | OXYGEN SATURATION: 96 % | HEIGHT: 65 IN | RESPIRATION RATE: 18 BRPM | TEMPERATURE: 98.4 F | BODY MASS INDEX: 25.71 KG/M2 | HEART RATE: 65 BPM | SYSTOLIC BLOOD PRESSURE: 127 MMHG | DIASTOLIC BLOOD PRESSURE: 58 MMHG

## 2019-05-11 LAB
ALBUMIN SERPL-MCNC: 3.2 G/DL (ref 3.5–5.2)
ALP BLD-CCNC: 61 U/L (ref 35–104)
ALT SERPL-CCNC: 9 U/L (ref 0–32)
ANION GAP SERPL CALCULATED.3IONS-SCNC: 10 MMOL/L (ref 7–16)
AST SERPL-CCNC: 17 U/L (ref 0–31)
BASOPHILS ABSOLUTE: 0.04 E9/L (ref 0–0.2)
BASOPHILS RELATIVE PERCENT: 0.7 % (ref 0–2)
BILIRUB SERPL-MCNC: 0.2 MG/DL (ref 0–1.2)
BUN BLDV-MCNC: 9 MG/DL (ref 8–23)
CALCIUM SERPL-MCNC: 8.6 MG/DL (ref 8.6–10.2)
CHLORIDE BLD-SCNC: 105 MMOL/L (ref 98–107)
CO2: 24 MMOL/L (ref 22–29)
CREAT SERPL-MCNC: 0.8 MG/DL (ref 0.5–1)
EOSINOPHILS ABSOLUTE: 0.17 E9/L (ref 0.05–0.5)
EOSINOPHILS RELATIVE PERCENT: 2.9 % (ref 0–6)
GFR AFRICAN AMERICAN: >60
GFR NON-AFRICAN AMERICAN: >60 ML/MIN/1.73
GLUCOSE BLD-MCNC: 101 MG/DL (ref 74–99)
HCT VFR BLD CALC: 29.8 % (ref 34–48)
HEMOGLOBIN: 9.6 G/DL (ref 11.5–15.5)
IMMATURE GRANULOCYTES #: 0.03 E9/L
IMMATURE GRANULOCYTES %: 0.5 % (ref 0–5)
LYMPHOCYTES ABSOLUTE: 1.51 E9/L (ref 1.5–4)
LYMPHOCYTES RELATIVE PERCENT: 26.2 % (ref 20–42)
MCH RBC QN AUTO: 30.9 PG (ref 26–35)
MCHC RBC AUTO-ENTMCNC: 32.2 % (ref 32–34.5)
MCV RBC AUTO: 95.8 FL (ref 80–99.9)
MONOCYTES ABSOLUTE: 0.57 E9/L (ref 0.1–0.95)
MONOCYTES RELATIVE PERCENT: 9.9 % (ref 2–12)
NEUTROPHILS ABSOLUTE: 3.45 E9/L (ref 1.8–7.3)
NEUTROPHILS RELATIVE PERCENT: 59.8 % (ref 43–80)
PDW BLD-RTO: 13.6 FL (ref 11.5–15)
PLATELET # BLD: 179 E9/L (ref 130–450)
PMV BLD AUTO: 9.6 FL (ref 7–12)
POTASSIUM SERPL-SCNC: 3.5 MMOL/L (ref 3.5–5)
RBC # BLD: 3.11 E12/L (ref 3.5–5.5)
SODIUM BLD-SCNC: 139 MMOL/L (ref 132–146)
TOTAL PROTEIN: 5.5 G/DL (ref 6.4–8.3)
WBC # BLD: 5.8 E9/L (ref 4.5–11.5)

## 2019-05-11 PROCEDURE — 36415 COLL VENOUS BLD VENIPUNCTURE: CPT

## 2019-05-11 PROCEDURE — 99223 1ST HOSP IP/OBS HIGH 75: CPT | Performed by: SURGERY

## 2019-05-11 PROCEDURE — 85025 COMPLETE CBC W/AUTO DIFF WBC: CPT

## 2019-05-11 PROCEDURE — 6370000000 HC RX 637 (ALT 250 FOR IP): Performed by: INTERNAL MEDICINE

## 2019-05-11 PROCEDURE — 80053 COMPREHEN METABOLIC PANEL: CPT

## 2019-05-11 RX ORDER — METRONIDAZOLE 500 MG/1
500 TABLET ORAL EVERY 8 HOURS SCHEDULED
Qty: 30 TABLET | Refills: 0 | Status: SHIPPED | OUTPATIENT
Start: 2019-05-11 | End: 2019-05-21

## 2019-05-11 RX ORDER — CIPROFLOXACIN 500 MG/1
500 TABLET, FILM COATED ORAL EVERY 12 HOURS SCHEDULED
Qty: 20 TABLET | Refills: 0 | Status: SHIPPED | OUTPATIENT
Start: 2019-05-11 | End: 2019-05-21

## 2019-05-11 RX ADMIN — FUROSEMIDE 20 MG: 20 TABLET ORAL at 05:33

## 2019-05-11 RX ADMIN — AMLODIPINE BESYLATE 5 MG: 5 TABLET ORAL at 09:00

## 2019-05-11 RX ADMIN — PANTOPRAZOLE SODIUM 40 MG: 40 TABLET, DELAYED RELEASE ORAL at 09:00

## 2019-05-11 RX ADMIN — LEVOTHYROXINE SODIUM 75 MCG: 75 TABLET ORAL at 05:33

## 2019-05-11 RX ADMIN — OYSTER SHELL CALCIUM WITH VITAMIN D 1 TABLET: 500; 200 TABLET, FILM COATED ORAL at 09:46

## 2019-05-11 RX ADMIN — METRONIDAZOLE 500 MG: 500 TABLET ORAL at 05:33

## 2019-05-11 RX ADMIN — BENAZEPRIL HYDROCHLORIDE 10 MG: 10 TABLET ORAL at 09:47

## 2019-05-11 RX ADMIN — SERTRALINE HYDROCHLORIDE 100 MG: 50 TABLET ORAL at 09:00

## 2019-05-11 RX ADMIN — CIPROFLOXACIN HYDROCHLORIDE 500 MG: 500 TABLET, FILM COATED ORAL at 09:00

## 2019-05-11 ASSESSMENT — PAIN SCALES - GENERAL
PAINLEVEL_OUTOF10: 0
PAINLEVEL_OUTOF10: 0

## 2019-05-11 NOTE — CONSULTS
General Surgery Consult    Patient's Name/Date of Birth: Toby Jackson / 1935    Date: May 11, 2019     Consulting Surgeon: Barbara Tijerina M.D.    PCP: Angel Car MD     Chief Complaint: abdominal pain    HPI:   Toby Jackson is a 80 y.o. female who presents for  evaluation of diffuse abdominal pain with coffee ground emesis and CT showing small bowel distention but is doing much better since hospitalization and is tolerating diet and moving bowels. Timing is intermittent and progressive, radiation to low abdomen, alleviated by nothing and started days ago, severity 5-8/10.  EGD showed HH and gastritis      Past Medical History:   Diagnosis Date    Acquired hypothyroidism     Arthritis     Blood transfusion     [facility] ([facility] [address]     Uterine    COPD (chronic obstructive pulmonary disease) ([facility] [address] [facility]     CVA, old, hemiparesis ([facility] [address]     Depression     GERD (gastroesophageal reflux disease)     Hyperlipidemia     Hypertension     Muscle weakness     Osteoporosis     Other disorders of kidney and ureter     Paroxysmal atrial fibrillation (HCC)     Thyroid disease     Unspecified cerebral artery occlusion with cerebral infarction     At age 39, residual left side weakness       Past Surgical History:   Procedure Laterality Date    ABDOMEN SURGERY      APPENDECTOMY      BACK SURGERY  6/17/16    kypho T12 and L3 with bone biopsies    BRAIN SURGERY      COLON SURGERY      COLONOSCOPY      ENDOSCOPY, COLON, DIAGNOSTIC      FIXATION KYPHOPLASTY  09/28/2017    L2, L4, L5 with Bone Biopsy & Epidural Injection    TONSILLECTOMY      UPPER GASTROINTESTINAL ENDOSCOPY N/A 5/9/2019    EGD BIOPSY performed by Leodan Wong MD at [place] [address]       Current Facility-Administered Medications   Medication Dose Route Frequency Provider Last Rate Last Dose    ciprofloxacin (CIPRO) tablet 500 mg  500 mg Oral 2 times per day Angel Car MD   500 mg at 05/10/19 2155    metroNIDAZOLE (FLAGYL) tablet 500 mg 500 mg Oral 3 times per day Blessing Lau MD   500 mg at 05/11/19 0533    acetaminophen (TYLENOL) tablet 650 mg  650 mg Oral Q6H PRN Blessing Lau MD        bisacodyl (DULCOLAX) suppository 10 mg  10 mg Rectal Daily PRN Blessing Lau MD        calcium-vitamin D 500-200 MG-UNIT per tablet 1 tablet  1 tablet Oral BID WC Blessing Lau MD   1 tablet at 05/10/19 1722    vitamin D (CHOLECALCIFEROL) tablet 2,000 Units  2,000 Units Oral Nightly Blessing Lau MD   2,000 Units at 05/10/19 2155    docusate sodium (COLACE) capsule 100 mg  100 mg Oral Daily PRN Blessing Lau MD        furosemide (LASIX) tablet 20 mg  20 mg Oral QAM AC Blessing Lau MD   20 mg at 05/11/19 0533    levothyroxine (SYNTHROID) tablet 75 mcg  75 mcg Oral Daily Blessing Lau MD   75 mcg at 05/11/19 0533    loperamide (IMODIUM) capsule 2 mg  2 mg Oral 4x Daily PRN Blessing Lau MD        magnesium hydroxide (MILK OF MAGNESIA) 400 MG/5ML suspension 30 mL  30 mL Oral Daily PRN Blessing Lau MD        pantoprazole (PROTONIX) tablet 40 mg  40 mg Oral BID Blessing Lau MD   40 mg at 05/10/19 2155    potassium chloride (KLOR-CON M) extended release tablet 20 mEq  20 mEq Oral Nightly Blessing Lau MD   20 mEq at 05/10/19 2155    rivaroxaban (XARELTO) tablet 20 mg  20 mg Oral Daily Blessing Lau MD   20 mg at 05/10/19 1722    sertraline (ZOLOFT) tablet 100 mg  100 mg Oral Daily Blessing Lau MD   100 mg at 05/10/19 0956    traMADol (ULTRAM) tablet 50 mg  50 mg Oral Q8H PRN Blessing Lau MD        ondansetron [facility]) injection 4 mg  4 mg Intravenous Q6H PRN Blessing Lau MD   4 mg at 05/10/19 0238    amLODIPine (NORVASC) tablet 5 mg  5 mg Oral Daily Blessing Lau MD   5 mg at 05/10/19 5482    And    benazepril (LOTENSIN) tablet 10 mg  10 mg Oral Daily Blessing Lau MD   10 mg at 05/10/19 0956    BIOTENE MOISTURIZING MOUTH (MOUTHKOTE) SOLN   Oral Q8H PRN Blessing Lau MD        0.9 % sodium chloride infusion   Intravenous Continuous Sheila Roblero MD 75 mL/hr at 05/09/19 2151      sodium chloride flush 0.9 % injection 10 mL  10 mL Intravenous 2 times per day Sheila Roblero MD   10 mL at 05/09/19 0829    sodium chloride flush 0.9 % injection 10 mL  10 mL Intravenous PRN Sheila Roblero MD           Allergies   Allergen Reactions    Codeine Other (See Comments)     Hallucinates.  Demerol Hives    Adhesive Tape Rash       The patient has a family history that is negative for severe cardiovascular or respiratory issues, negative for reaction to anesthesia. Social History     Socioeconomic History    Marital status:       Spouse name: Not on file    Number of children: Not on file    Years of education: Not on file    Highest education level: Not on file   Occupational History    Not on file   Social Needs    Financial resource strain: Not on file    Food insecurity:     Worry: Not on file     Inability: Not on file    Transportation needs:     Medical: Not on file     Non-medical: Not on file   Tobacco Use    Smoking status: Never Smoker    Smokeless tobacco: Never Used   Substance and Sexual Activity    Alcohol use: No    Drug use: Not on file    Sexual activity: Not on file   Lifestyle    Physical activity:     Days per week: Not on file     Minutes per session: Not on file    Stress: Not on file   Relationships    Social connections:     Talks on phone: Not on file     Gets together: Not on file     Attends Confucianist service: Not on file     Active member of club or organization: Not on file     Attends meetings of clubs or organizations: Not on file     Relationship status: Not on file    Intimate partner violence:     Fear of current or ex partner: Not on file     Emotionally abused: Not on file     Physically abused: Not on file     Forced sexual activity: Not on file   Other Topics Concern    Not on file   Social History Narrative    Not on file Review of Systems  General ROS: negative for - chills, fatigue or fever  ENT ROS: negative for - headaches, hearing change or nasal congestion  Endocrine ROS: negative for - breast changes or galactorrhea, no polyuria  Respiratory ROS: negative for - hemoptysis, orthopnea or pleuritic pain  Cardiovascular ROS: negative for - dyspnea on exertion, edema or loss of consciousness  Gastrointestinal ROS: negative for - blood in stools, heartburn, hematemesis or melena  : no polyuria, no dysuria  Musculoskeletal ROS: negative for - gait disturbance  Dermatological ROS: negative for - acne, dry skin or eczema  Neuro ROS: no recent memory loss or mood swings. Physical exam:   BP (!) 106/54   Pulse 62   Temp 98.5 °F (36.9 °C) (Oral)   Resp 18   Ht 5' 5\" (1.651 m)   Wt 154 lb 4.8 oz (70 kg)   SpO2 96%   BMI 25.68 kg/m²   General appearance:  NAD  Head: NCAT, PERRLA, EOMI, red conjunctiva  Neck: supple, no masses  Lungs: CTAB, equal chest rise bilateral  Heart: Reg rate  Abdomen: soft, nontender, nondistended,  Skin; no lesions  Gu: no cva tenderness  Extremities: extremities normal, atraumatic, no cyanosis or edema      Radiology:  CT abdomen/pelvis:   1. Distended loops of small bowel, without definite transition point,   ileus versus early obstruction. 2. Cholelithiasis. Assessment:  80 y.o. female with epigastric pain and ileus now resolving    Plan:  Diet as tolerated, no interventions planned.       Darline Vasquez MD  5/11/2019  6:55 AM

## 2019-05-11 NOTE — CARE COORDINATION
SS NOTES: Arrangements completed for pt to be transferred to [facility] today at [address] [place] [address] via [facility] [employer] SW to advise pt's family of the transfer. Pt will need a signed ANGELICA. Baylee Guerra. 5/11/2019.10:33 AM.

## 2019-05-11 NOTE — PROGRESS NOTES
Called nurse to nurse to patricia arnett. Spoke with Frank Lazar. Asked if can fax over paperwork, [phone].

## 2019-05-11 NOTE — PROGRESS NOTES
Patient is seen in follow-up for chest pain    Subjective:     Ms. Joanna Ramirez  denies any chest pain, no shortness of breath, no lightheadedness or dizziness, she had her EGD earlier today  Sitting up in bed no apparent distress    ROS:  CONSTITUTIONAL:  negative for  fevers, chills  HEENT:  negative for earaches, nasal congestion and epistaxis  RESPIRATORY:  negative for  dry cough, cough with sputum,wheezing and hemoptysis  GASTROINTESTINAL:  negative for nausea, vomiting  MUSCULOSKELETAL:  negative for  myalgias, arthralgias  NEUROLOGICAL:  negative for visual disturbance, dysphagia    Medication side effects: None    Scheduled Meds:   ciprofloxacin  500 mg Oral 2 times per day    metroNIDAZOLE  500 mg Oral 3 times per day    calcium-vitamin D  1 tablet Oral BID WC    vitamin D  2,000 Units Oral Nightly    furosemide  20 mg Oral QAM AC    levothyroxine  75 mcg Oral Daily    pantoprazole  40 mg Oral BID    potassium chloride  20 mEq Oral Nightly    rivaroxaban  20 mg Oral Daily    sertraline  100 mg Oral Daily    amLODIPine  5 mg Oral Daily    And    benazepril  10 mg Oral Daily    sodium chloride flush  10 mL Intravenous 2 times per day     Continuous Infusions:   sodium chloride 75 mL/hr at 05/09/19 2151     PRN Meds:acetaminophen, bisacodyl, docusate sodium, loperamide, magnesium hydroxide, traMADol, ondansetron, BIOTENE MOISTURIZING MOUTH, sodium chloride flush      Objective:      Physical Exam:   BP (!) 106/54   Pulse 62   Temp 98.5 °F (36.9 °C) (Oral)   Resp 18   Ht 5' 5\" (1.651 m)   Wt 148 lb 8 oz (67.4 kg)   SpO2 96%   BMI 24.71 kg/m²   CONSTITUTIONAL:  awake, alert, cooperative, no apparent distress, and appears stated age  HEAD:  normocepalic, without obvious abnormality, atraumatic  NECK:  Supple, symmetrical, trachea midline, no adenopathy, thyroid symmetric, not enlarged and no tenderness, skin normal  LUNGS:  No increased work of breathing, good air exchange, clear to auscultation bilaterally, no crackles or wheezing  CARDIOVASCULAR:  Normal apical impulse, regular rate and rhythm, normal S1 and S2, no S3 or S4, 3/6 systolic murmur at the apex, 2/6 systolic murmur at the left lower sternal border, no edema, no JVD, no carotid bruit. ABDOMEN:  Soft, nontender, no masses, no hepatomegaly, no splenomegaly, BS+  MUSCULOSKELETAL:  No clubbing no cyanosis. there is no redness, warmth, or swelling of the joints  full range of motion noted  NEUROLOGIC:  Alert, awake,oriented x3  SKIN:  no bruising or bleeding, normal skin color, texture, turgor and no redness, warmth, or swelling    Cardiographics  I personally reviewed the telemetry monitor strips with the following interpretation: Sinus rhythm    Echocardiogram:     Imaging  CT ABDOMEN PELVIS W IV CONTRAST Additional Contrast? None   Final Result   1. Distended loops of small bowel, without definite transition point,   ileus versus early obstruction. 2. Cholelithiasis. XR CHEST PORTABLE   Final Result   No acute cardiopulmonary disease. Lab Review   Lab Results   Component Value Date     05/10/2019    K 3.5 05/10/2019     05/10/2019    CO2 20 05/10/2019    BUN 12 05/10/2019    CREATININE 0.8 05/10/2019    GLUCOSE 135 05/10/2019    GLUCOSE 97 03/12/2012    CALCIUM 8.6 05/10/2019     Lab Results   Component Value Date    WBC 6.6 05/10/2019    HGB 10.0 05/10/2019    HCT 30.8 05/10/2019    MCV 96.3 05/10/2019     05/10/2019     I have personally reviewed the laboratory, cardiac diagnostic and radiographic testing as outlined above:    Assessment:     1. Chest Pain: Atypical.  Suspect GI in origin  2. Atrial Fibrillation: Paroxsymal. Currently SR. Chronic anticoagulation with Xarelto   3. Hypertension   4. Hyperlipidemia   5. Thyroid Disease  6. GERD   7. History of CVA: with Left sided weakness   8. COPD   9. Anemia: Etiology? For EGD today     Recommendations:     1. Will resume anticoagulation when okay with GI  2. Increase ambulation as tolerated     discussed with patient, no family at bedside   Electronically signed by Melissa Barton MD on 5/10/2019 at 8:48 PM  NOTE: This report was transcribed using voice recognition software.  Every effort was made to ensure accuracy; however, inadvertent computerized transcription errors may be present

## 2019-06-30 NOTE — DISCHARGE SUMMARY
Physician Discharge Summary     Patient ID:  Brandy Parr  [id number]  89 y.o.  1935    Admit date: 5/8/2019    Discharge date and time: 5/11/2019  1:24 PM     Admitting Physician: Amadeo Torres MD     Discharge Physician: Amadeo Torres MD      Admission Diagnoses:   GI bleed [K92.2]    Discharge Diagnoses:    Osteoporosis    Vertigo    Acquired hypothyroidism    [facility] old[facility]iparesis [facility] [address]    Hyperlipidemia    Hypertension    Cerebral artery occlusion with cerebral infarction ([facility] [address]    Maxillary sinusitis    Depression    Paroxysmal atrial fibrillation (HCC)    Vitamin D insufficiency    Closed compression fracture of third lumbar vertebra (HCC)    Closed compression fracture of lumbar vertebra (HCC)    Constipation    Congenital spondylolisthesis of lumbar region    Spinal stenosis, lumbar region, without neurogenic claudication    Thoracic back pain    Compression fracture of lumbar vertebra (HCC)    Right lower quadrant abdominal pain    GI bleed             1. Admission Condition: unstable    Discharged Condition: stable    Hospital Course: improved    Consults: dr Anne-Marie Adhikari, dr Eugene Prieto    Discharge Exam:  Chest: Breath sounds were clear and equal with no rales, wheezes, or rhonchi. Respiratory effort was normal with no retractions or use of accessory muscles. Cardiovascular: Heart sounds were normal with a regular rate and rhythm. There were no murmurs or gallops. Abdomen: Bowel sounds were normal.  The abdomen was soft and non distended. There was no tenderness, guarding, rebound, or rigidity. There was no masses, hepatosplenomegaly, or hernias.     Disposition: NH    Discharge Medications:   Crystal Annalee   Home Medication Instructions EMS:[id number]    Printed on:06/30/19 5462   Medication Information                      acetaminophen (TYLENOL) 325 MG tablet  Take 650 mg by mouth every 4 hours as needed for Pain or Fever             amlodipine-benazepril (LOTREL) 5-10 MG per capsule  Take 1 capsule by mouth daily. BIOTENE MOISTURIZING MOUTH (MOUTHKOTE) SOLN  Take 5 mLs by mouth every 8 hours as needed (Dry Mouth)             bisacodyl (DULCOLAX) 10 MG suppository  Place 10 mg rectally daily as needed for Constipation             Calcium Carb-Cholecalciferol (CALCIUM-VITAMIN D) 500-200 MG-UNIT per tablet  Take 1 tablet by mouth 2 times daily (with meals)             Cholecalciferol (VITAMIN D) 2000 units TABS tablet  Take 2,000 Units by mouth nightly             docusate sodium (COLACE) 100 MG capsule  Take 100 mg by mouth daily as needed for Constipation             furosemide (LASIX) 20 MG tablet  Take 20 mg by mouth every morning (before breakfast)             levothyroxine (SYNTHROID) 75 MCG tablet  Take 1 tablet by mouth Daily             loperamide (IMODIUM) 2 MG capsule  Take 2 mg by mouth 4 times daily as needed for Diarrhea             magnesium hydroxide (MILK OF MAGNESIA) 400 MG/5ML suspension  Take 30 mLs by mouth daily as needed for Constipation             Menthol, Topical Analgesic, (BIOFREEZE EX)  Apply 1 each topically every 8 hours as needed (Pain)             Omega-3 Fatty Acids (FISH OIL) 1000 MG CAPS  Take 1,000 mg by mouth daily. pantoprazole (PROTONIX) 40 MG tablet  Take 40 mg by mouth 2 times daily             potassium chloride (KLOR-CON M) 20 MEQ extended release tablet  Take 20 mEq by mouth nightly             rivaroxaban (XARELTO) 20 MG TABS tablet  Take 20 mg by mouth nightly             sertraline (ZOLOFT) 100 MG tablet  Take 100 mg by mouth daily             traMADol (ULTRAM) 50 MG tablet  Take 1 tablet by mouth every 8 hours as needed for Pain                   Follow-up with Dr. Percy Winn in 1 week, patient to call [phone] for an appointment and if has any problems.       Electronically signed by Sumi Valdez MD on 6/30/19 at 7:34 AM

## 2024-01-23 ENCOUNTER — HOSPITAL ENCOUNTER (INPATIENT)
Age: 89
LOS: 11 days | End: 2024-02-03
Attending: EMERGENCY MEDICINE | Admitting: INTERNAL MEDICINE
Payer: COMMERCIAL

## 2024-01-23 ENCOUNTER — APPOINTMENT (OUTPATIENT)
Dept: GENERAL RADIOLOGY | Age: 89
End: 2024-01-23
Payer: COMMERCIAL

## 2024-01-23 ENCOUNTER — APPOINTMENT (OUTPATIENT)
Dept: CT IMAGING | Age: 89
End: 2024-01-23
Payer: COMMERCIAL

## 2024-01-23 DIAGNOSIS — S72.8X2A OTHER FRACTURE OF LEFT FEMUR, INITIAL ENCOUNTER FOR CLOSED FRACTURE (HCC): ICD-10-CM

## 2024-01-23 DIAGNOSIS — S72.412A CLOSED DISPLACED FRACTURE OF CONDYLE OF LEFT FEMUR, INITIAL ENCOUNTER (HCC): Primary | ICD-10-CM

## 2024-01-23 LAB
ALBUMIN SERPL-MCNC: 3.6 G/DL (ref 3.5–5.2)
ALP SERPL-CCNC: 103 U/L (ref 35–104)
ALT SERPL-CCNC: 31 U/L (ref 0–32)
ANION GAP SERPL CALCULATED.3IONS-SCNC: 8 MMOL/L (ref 7–16)
AST SERPL-CCNC: 28 U/L (ref 0–31)
BASOPHILS # BLD: 0.05 K/UL (ref 0–0.2)
BASOPHILS NFR BLD: 0 % (ref 0–2)
BILIRUB SERPL-MCNC: 0.3 MG/DL (ref 0–1.2)
BUN SERPL-MCNC: 23 MG/DL (ref 6–23)
CALCIUM SERPL-MCNC: 9.5 MG/DL (ref 8.6–10.2)
CHLORIDE SERPL-SCNC: 104 MMOL/L (ref 98–107)
CO2 SERPL-SCNC: 24 MMOL/L (ref 22–29)
CREAT SERPL-MCNC: 1.1 MG/DL (ref 0.5–1)
EOSINOPHIL # BLD: 0 K/UL (ref 0.05–0.5)
EOSINOPHILS RELATIVE PERCENT: 0 % (ref 0–6)
ERYTHROCYTE [DISTWIDTH] IN BLOOD BY AUTOMATED COUNT: 13.8 % (ref 11.5–15)
GFR SERPL CREATININE-BSD FRML MDRD: 48 ML/MIN/1.73M2
GLUCOSE SERPL-MCNC: 112 MG/DL (ref 74–99)
HCT VFR BLD AUTO: 26.5 % (ref 34–48)
HGB BLD-MCNC: 8.7 G/DL (ref 11.5–15.5)
IMM GRANULOCYTES # BLD AUTO: 0.04 K/UL (ref 0–0.58)
IMM GRANULOCYTES NFR BLD: 0 % (ref 0–5)
INR PPP: 2.6
LYMPHOCYTES NFR BLD: 1.59 K/UL (ref 1.5–4)
LYMPHOCYTES RELATIVE PERCENT: 13 % (ref 20–42)
MCH RBC QN AUTO: 31.8 PG (ref 26–35)
MCHC RBC AUTO-ENTMCNC: 32.8 G/DL (ref 32–34.5)
MCV RBC AUTO: 96.7 FL (ref 80–99.9)
MONOCYTES NFR BLD: 1.21 K/UL (ref 0.1–0.95)
MONOCYTES NFR BLD: 10 % (ref 2–12)
NEUTROPHILS NFR BLD: 77 % (ref 43–80)
NEUTS SEG NFR BLD: 9.67 K/UL (ref 1.8–7.3)
PARTIAL THROMBOPLASTIN TIME: 36 SEC (ref 24.5–35.1)
PLATELET # BLD AUTO: 223 K/UL (ref 130–450)
PMV BLD AUTO: 9.9 FL (ref 7–12)
POTASSIUM SERPL-SCNC: 4.6 MMOL/L (ref 3.5–5)
PROT SERPL-MCNC: 5.9 G/DL (ref 6.4–8.3)
PROTHROMBIN TIME: 29.3 SEC (ref 9.3–12.4)
RBC # BLD AUTO: 2.74 M/UL (ref 3.5–5.5)
SODIUM SERPL-SCNC: 136 MMOL/L (ref 132–146)
WBC OTHER # BLD: 12.6 K/UL (ref 4.5–11.5)

## 2024-01-23 PROCEDURE — 1200000000 HC SEMI PRIVATE

## 2024-01-23 PROCEDURE — 86905 BLOOD TYPING RBC ANTIGENS: CPT

## 2024-01-23 PROCEDURE — 86870 RBC ANTIBODY IDENTIFICATION: CPT

## 2024-01-23 PROCEDURE — 85025 COMPLETE CBC W/AUTO DIFF WBC: CPT

## 2024-01-23 PROCEDURE — 2580000003 HC RX 258: Performed by: INTERNAL MEDICINE

## 2024-01-23 PROCEDURE — 86850 RBC ANTIBODY SCREEN: CPT

## 2024-01-23 PROCEDURE — 73521 X-RAY EXAM HIPS BI 2 VIEWS: CPT

## 2024-01-23 PROCEDURE — 86920 COMPATIBILITY TEST SPIN: CPT

## 2024-01-23 PROCEDURE — 73700 CT LOWER EXTREMITY W/O DYE: CPT

## 2024-01-23 PROCEDURE — 71045 X-RAY EXAM CHEST 1 VIEW: CPT

## 2024-01-23 PROCEDURE — 73552 X-RAY EXAM OF FEMUR 2/>: CPT

## 2024-01-23 PROCEDURE — 80053 COMPREHEN METABOLIC PANEL: CPT

## 2024-01-23 PROCEDURE — 36415 COLL VENOUS BLD VENIPUNCTURE: CPT

## 2024-01-23 PROCEDURE — 85610 PROTHROMBIN TIME: CPT

## 2024-01-23 PROCEDURE — 86901 BLOOD TYPING SEROLOGIC RH(D): CPT

## 2024-01-23 PROCEDURE — 99285 EMERGENCY DEPT VISIT HI MDM: CPT

## 2024-01-23 PROCEDURE — 85730 THROMBOPLASTIN TIME PARTIAL: CPT

## 2024-01-23 PROCEDURE — 6370000000 HC RX 637 (ALT 250 FOR IP)

## 2024-01-23 PROCEDURE — 73560 X-RAY EXAM OF KNEE 1 OR 2: CPT

## 2024-01-23 PROCEDURE — 86880 COOMBS TEST DIRECT: CPT

## 2024-01-23 PROCEDURE — 86900 BLOOD TYPING SEROLOGIC ABO: CPT

## 2024-01-23 PROCEDURE — 93005 ELECTROCARDIOGRAM TRACING: CPT

## 2024-01-23 PROCEDURE — 86922 COMPATIBILITY TEST ANTIGLOB: CPT

## 2024-01-23 RX ORDER — TRAMADOL HYDROCHLORIDE 50 MG/1
50 TABLET ORAL ONCE
Status: COMPLETED | OUTPATIENT
Start: 2024-01-23 | End: 2024-01-23

## 2024-01-23 RX ORDER — PANTOPRAZOLE SODIUM 40 MG/1
40 TABLET, DELAYED RELEASE ORAL 2 TIMES DAILY
Status: DISCONTINUED | OUTPATIENT
Start: 2024-01-23 | End: 2024-01-28

## 2024-01-23 RX ORDER — 0.9 % SODIUM CHLORIDE 0.9 %
1000 INTRAVENOUS SOLUTION INTRAVENOUS ONCE
Status: COMPLETED | OUTPATIENT
Start: 2024-01-23 | End: 2024-01-24

## 2024-01-23 RX ORDER — POLYETHYLENE GLYCOL 3350 17 G/17G
17 POWDER, FOR SOLUTION ORAL DAILY PRN
Status: DISCONTINUED | OUTPATIENT
Start: 2024-01-23 | End: 2024-01-28

## 2024-01-23 RX ORDER — OXYCODONE HYDROCHLORIDE 5 MG/1
5 TABLET ORAL EVERY 4 HOURS PRN
Status: DISCONTINUED | OUTPATIENT
Start: 2024-01-23 | End: 2024-01-27

## 2024-01-23 RX ORDER — SODIUM CHLORIDE 0.9 % (FLUSH) 0.9 %
5-40 SYRINGE (ML) INJECTION PRN
Status: DISCONTINUED | OUTPATIENT
Start: 2024-01-23 | End: 2024-02-03 | Stop reason: HOSPADM

## 2024-01-23 RX ORDER — DEXTROSE MONOHYDRATE 100 MG/ML
INJECTION, SOLUTION INTRAVENOUS CONTINUOUS PRN
Status: DISCONTINUED | OUTPATIENT
Start: 2024-01-23 | End: 2024-01-28

## 2024-01-23 RX ORDER — OXYCODONE HYDROCHLORIDE 5 MG/1
10 TABLET ORAL EVERY 4 HOURS PRN
Status: DISCONTINUED | OUTPATIENT
Start: 2024-01-23 | End: 2024-01-27

## 2024-01-23 RX ORDER — SODIUM CHLORIDE 9 MG/ML
INJECTION, SOLUTION INTRAVENOUS PRN
Status: DISCONTINUED | OUTPATIENT
Start: 2024-01-23 | End: 2024-01-28

## 2024-01-23 RX ORDER — LEVOTHYROXINE SODIUM 0.07 MG/1
75 TABLET ORAL
Status: DISCONTINUED | OUTPATIENT
Start: 2024-01-24 | End: 2024-01-28

## 2024-01-23 RX ORDER — POTASSIUM CHLORIDE 20 MEQ/1
40 TABLET, EXTENDED RELEASE ORAL PRN
Status: DISCONTINUED | OUTPATIENT
Start: 2024-01-23 | End: 2024-01-28

## 2024-01-23 RX ORDER — SODIUM CHLORIDE 0.9 % (FLUSH) 0.9 %
5-40 SYRINGE (ML) INJECTION EVERY 12 HOURS SCHEDULED
Status: DISCONTINUED | OUTPATIENT
Start: 2024-01-23 | End: 2024-01-28

## 2024-01-23 RX ORDER — ACETAMINOPHEN 325 MG/1
650 TABLET ORAL EVERY 6 HOURS PRN
Status: DISCONTINUED | OUTPATIENT
Start: 2024-01-23 | End: 2024-02-03 | Stop reason: HOSPADM

## 2024-01-23 RX ORDER — ACETAMINOPHEN 650 MG/1
650 SUPPOSITORY RECTAL EVERY 6 HOURS PRN
Status: DISCONTINUED | OUTPATIENT
Start: 2024-01-23 | End: 2024-02-03 | Stop reason: HOSPADM

## 2024-01-23 RX ORDER — POTASSIUM CHLORIDE 7.45 MG/ML
10 INJECTION INTRAVENOUS PRN
Status: DISCONTINUED | OUTPATIENT
Start: 2024-01-23 | End: 2024-01-28

## 2024-01-23 RX ORDER — MAGNESIUM SULFATE IN WATER 40 MG/ML
2000 INJECTION, SOLUTION INTRAVENOUS PRN
Status: DISCONTINUED | OUTPATIENT
Start: 2024-01-23 | End: 2024-01-28

## 2024-01-23 RX ORDER — GLUCAGON 1 MG/ML
1 KIT INJECTION PRN
Status: DISCONTINUED | OUTPATIENT
Start: 2024-01-23 | End: 2024-01-28

## 2024-01-23 RX ADMIN — SODIUM CHLORIDE 1000 ML: 9 INJECTION, SOLUTION INTRAVENOUS at 23:19

## 2024-01-23 RX ADMIN — TRAMADOL HYDROCHLORIDE 50 MG: 50 TABLET ORAL at 19:56

## 2024-01-23 ASSESSMENT — PAIN DESCRIPTION - PAIN TYPE: TYPE: ACUTE PAIN

## 2024-01-23 ASSESSMENT — PAIN - FUNCTIONAL ASSESSMENT: PAIN_FUNCTIONAL_ASSESSMENT: 0-10

## 2024-01-23 ASSESSMENT — LIFESTYLE VARIABLES
HOW OFTEN DO YOU HAVE A DRINK CONTAINING ALCOHOL: NEVER
HOW MANY STANDARD DRINKS CONTAINING ALCOHOL DO YOU HAVE ON A TYPICAL DAY: PATIENT DOES NOT DRINK
HOW OFTEN DO YOU HAVE A DRINK CONTAINING ALCOHOL: NEVER

## 2024-01-23 ASSESSMENT — PAIN DESCRIPTION - LOCATION: LOCATION: LEG

## 2024-01-23 ASSESSMENT — PAIN DESCRIPTION - ORIENTATION: ORIENTATION: LEFT

## 2024-01-23 ASSESSMENT — PAIN SCALES - GENERAL: PAINLEVEL_OUTOF10: 8

## 2024-01-24 ENCOUNTER — ANESTHESIA EVENT (OUTPATIENT)
Dept: OPERATING ROOM | Age: 89
End: 2024-01-24
Payer: COMMERCIAL

## 2024-01-24 ENCOUNTER — APPOINTMENT (OUTPATIENT)
Dept: GENERAL RADIOLOGY | Age: 89
End: 2024-01-24
Payer: COMMERCIAL

## 2024-01-24 ENCOUNTER — ANESTHESIA (OUTPATIENT)
Dept: OPERATING ROOM | Age: 89
End: 2024-01-24
Payer: COMMERCIAL

## 2024-01-24 PROBLEM — S72.452A DISPLACED SUPRACONDYLAR FRACTURE WITHOUT INTRACONDYLAR EXTENSION OF LOWER END OF LEFT FEMUR, INITIAL ENCOUNTER FOR CLOSED FRACTURE (HCC): Status: ACTIVE | Noted: 2024-01-24

## 2024-01-24 LAB
ALBUMIN SERPL-MCNC: 3.4 G/DL (ref 3.5–5.2)
ALP SERPL-CCNC: 95 U/L (ref 35–104)
ALT SERPL-CCNC: 23 U/L (ref 0–32)
ANION GAP SERPL CALCULATED.3IONS-SCNC: 9 MMOL/L (ref 7–16)
AST SERPL-CCNC: 23 U/L (ref 0–31)
BACTERIA URNS QL MICRO: ABNORMAL
BASOPHILS # BLD: 0.04 K/UL (ref 0–0.2)
BASOPHILS NFR BLD: 0 % (ref 0–2)
BILIRUB SERPL-MCNC: 0.3 MG/DL (ref 0–1.2)
BILIRUB UR QL STRIP: NEGATIVE
BUN SERPL-MCNC: 22 MG/DL (ref 6–23)
CALCIUM SERPL-MCNC: 8.8 MG/DL (ref 8.6–10.2)
CHLORIDE SERPL-SCNC: 104 MMOL/L (ref 98–107)
CLARITY UR: CLEAR
CO2 SERPL-SCNC: 22 MMOL/L (ref 22–29)
COLOR UR: YELLOW
CREAT SERPL-MCNC: 1 MG/DL (ref 0.5–1)
EKG ATRIAL RATE: 69 BPM
EKG P AXIS: 42 DEGREES
EKG P-R INTERVAL: 172 MS
EKG Q-T INTERVAL: 408 MS
EKG QRS DURATION: 70 MS
EKG QTC CALCULATION (BAZETT): 437 MS
EKG R AXIS: 45 DEGREES
EKG T AXIS: 58 DEGREES
EKG VENTRICULAR RATE: 69 BPM
EOSINOPHIL # BLD: 0 K/UL (ref 0.05–0.5)
EOSINOPHILS RELATIVE PERCENT: 0 % (ref 0–6)
EPI CELLS #/AREA URNS HPF: ABNORMAL /HPF
ERYTHROCYTE [DISTWIDTH] IN BLOOD BY AUTOMATED COUNT: 13.7 % (ref 11.5–15)
GFR SERPL CREATININE-BSD FRML MDRD: 52 ML/MIN/1.73M2
GLUCOSE SERPL-MCNC: 128 MG/DL (ref 74–99)
GLUCOSE UR STRIP-MCNC: NEGATIVE MG/DL
HCT VFR BLD AUTO: 24.5 % (ref 34–48)
HGB BLD-MCNC: 7.9 G/DL (ref 11.5–15.5)
HGB UR QL STRIP.AUTO: NEGATIVE
IMM GRANULOCYTES # BLD AUTO: 0.05 K/UL (ref 0–0.58)
IMM GRANULOCYTES NFR BLD: 0 % (ref 0–5)
INR PPP: 1.4
INR PPP: 1.9
KETONES UR STRIP-MCNC: NEGATIVE MG/DL
LEUKOCYTE ESTERASE UR QL STRIP: ABNORMAL
LYMPHOCYTES NFR BLD: 1.64 K/UL (ref 1.5–4)
LYMPHOCYTES RELATIVE PERCENT: 15 % (ref 20–42)
MAGNESIUM SERPL-MCNC: 1.9 MG/DL (ref 1.6–2.6)
MCH RBC QN AUTO: 32.1 PG (ref 26–35)
MCHC RBC AUTO-ENTMCNC: 32.2 G/DL (ref 32–34.5)
MCV RBC AUTO: 99.6 FL (ref 80–99.9)
MONOCYTES NFR BLD: 1.19 K/UL (ref 0.1–0.95)
MONOCYTES NFR BLD: 11 % (ref 2–12)
NEUTROPHILS NFR BLD: 74 % (ref 43–80)
NEUTS SEG NFR BLD: 8.38 K/UL (ref 1.8–7.3)
NITRITE UR QL STRIP: POSITIVE
PARTIAL THROMBOPLASTIN TIME: 34 SEC (ref 24.5–35.1)
PH UR STRIP: 5.5 [PH] (ref 5–9)
PHOSPHATE SERPL-MCNC: 3.6 MG/DL (ref 2.5–4.5)
PLATELET # BLD AUTO: 209 K/UL (ref 130–450)
PMV BLD AUTO: 10.9 FL (ref 7–12)
POTASSIUM SERPL-SCNC: 5 MMOL/L (ref 3.5–5)
PROCALCITONIN SERPL-MCNC: 0.24 NG/ML (ref 0–0.08)
PROT SERPL-MCNC: 5.6 G/DL (ref 6.4–8.3)
PROT UR STRIP-MCNC: NEGATIVE MG/DL
PROTHROMBIN TIME: 16 SEC (ref 9.3–12.4)
PROTHROMBIN TIME: 20.9 SEC (ref 9.3–12.4)
RBC # BLD AUTO: 2.46 M/UL (ref 3.5–5.5)
RBC #/AREA URNS HPF: ABNORMAL /HPF
SODIUM SERPL-SCNC: 135 MMOL/L (ref 132–146)
SP GR UR STRIP: 1.02 (ref 1–1.03)
T4 FREE SERPL-MCNC: 0.8 NG/DL (ref 0.9–1.7)
TSH SERPL DL<=0.05 MIU/L-ACNC: 3.97 UIU/ML (ref 0.27–4.2)
UROBILINOGEN UR STRIP-ACNC: 0.2 EU/DL (ref 0–1)
WBC #/AREA URNS HPF: ABNORMAL /HPF
WBC OTHER # BLD: 11.3 K/UL (ref 4.5–11.5)

## 2024-01-24 PROCEDURE — 83735 ASSAY OF MAGNESIUM: CPT

## 2024-01-24 PROCEDURE — 27511 TREATMENT OF THIGH FRACTURE: CPT | Performed by: ORTHOPAEDIC SURGERY

## 2024-01-24 PROCEDURE — 6370000000 HC RX 637 (ALT 250 FOR IP): Performed by: ORTHOPAEDIC SURGERY

## 2024-01-24 PROCEDURE — 2500000003 HC RX 250 WO HCPCS

## 2024-01-24 PROCEDURE — 6370000000 HC RX 637 (ALT 250 FOR IP)

## 2024-01-24 PROCEDURE — 7100000000 HC PACU RECOVERY - FIRST 15 MIN: Performed by: ORTHOPAEDIC SURGERY

## 2024-01-24 PROCEDURE — 2720000010 HC SURG SUPPLY STERILE: Performed by: ORTHOPAEDIC SURGERY

## 2024-01-24 PROCEDURE — 84443 ASSAY THYROID STIM HORMONE: CPT

## 2024-01-24 PROCEDURE — 84145 PROCALCITONIN (PCT): CPT

## 2024-01-24 PROCEDURE — C1769 GUIDE WIRE: HCPCS | Performed by: ORTHOPAEDIC SURGERY

## 2024-01-24 PROCEDURE — 6360000002 HC RX W HCPCS

## 2024-01-24 PROCEDURE — 0QS706Z REPOSITION LEFT UPPER FEMUR WITH INTRAMEDULLARY INTERNAL FIXATION DEVICE, OPEN APPROACH: ICD-10-PCS | Performed by: ORTHOPAEDIC SURGERY

## 2024-01-24 PROCEDURE — 99221 1ST HOSP IP/OBS SF/LOW 40: CPT | Performed by: ORTHOPAEDIC SURGERY

## 2024-01-24 PROCEDURE — 93010 ELECTROCARDIOGRAM REPORT: CPT | Performed by: INTERNAL MEDICINE

## 2024-01-24 PROCEDURE — 36415 COLL VENOUS BLD VENIPUNCTURE: CPT

## 2024-01-24 PROCEDURE — 2580000003 HC RX 258

## 2024-01-24 PROCEDURE — 85025 COMPLETE CBC W/AUTO DIFF WBC: CPT

## 2024-01-24 PROCEDURE — 81001 URINALYSIS AUTO W/SCOPE: CPT

## 2024-01-24 PROCEDURE — 7100000001 HC PACU RECOVERY - ADDTL 15 MIN: Performed by: ORTHOPAEDIC SURGERY

## 2024-01-24 PROCEDURE — 84439 ASSAY OF FREE THYROXINE: CPT

## 2024-01-24 PROCEDURE — 2709999900 HC NON-CHARGEABLE SUPPLY: Performed by: ORTHOPAEDIC SURGERY

## 2024-01-24 PROCEDURE — 84100 ASSAY OF PHOSPHORUS: CPT

## 2024-01-24 PROCEDURE — 3700000000 HC ANESTHESIA ATTENDED CARE: Performed by: ORTHOPAEDIC SURGERY

## 2024-01-24 PROCEDURE — 85610 PROTHROMBIN TIME: CPT

## 2024-01-24 PROCEDURE — 2580000003 HC RX 258: Performed by: ORTHOPAEDIC SURGERY

## 2024-01-24 PROCEDURE — 1200000000 HC SEMI PRIVATE

## 2024-01-24 PROCEDURE — 3600000004 HC SURGERY LEVEL 4 BASE: Performed by: ORTHOPAEDIC SURGERY

## 2024-01-24 PROCEDURE — 6370000000 HC RX 637 (ALT 250 FOR IP): Performed by: INTERNAL MEDICINE

## 2024-01-24 PROCEDURE — 3700000001 HC ADD 15 MINUTES (ANESTHESIA): Performed by: ORTHOPAEDIC SURGERY

## 2024-01-24 PROCEDURE — 80053 COMPREHEN METABOLIC PANEL: CPT

## 2024-01-24 PROCEDURE — C1713 ANCHOR/SCREW BN/BN,TIS/BN: HCPCS | Performed by: ORTHOPAEDIC SURGERY

## 2024-01-24 PROCEDURE — 3600000014 HC SURGERY LEVEL 4 ADDTL 15MIN: Performed by: ORTHOPAEDIC SURGERY

## 2024-01-24 DEVICE — ADVANCED LOCKING SCREW: Type: IMPLANTABLE DEVICE | Site: LEG | Status: FUNCTIONAL

## 2024-01-24 DEVICE — K-WIRE: Type: IMPLANTABLE DEVICE | Status: FUNCTIONAL

## 2024-01-24 DEVICE — LOCKING SCREW
Type: IMPLANTABLE DEVICE | Site: LEG | Status: FUNCTIONAL
Brand: T2 ALPHA

## 2024-01-24 RX ORDER — SODIUM CHLORIDE, SODIUM LACTATE, POTASSIUM CHLORIDE, CALCIUM CHLORIDE 600; 310; 30; 20 MG/100ML; MG/100ML; MG/100ML; MG/100ML
INJECTION, SOLUTION INTRAVENOUS CONTINUOUS PRN
Status: DISCONTINUED | OUTPATIENT
Start: 2024-01-24 | End: 2024-01-24 | Stop reason: SDUPTHER

## 2024-01-24 RX ORDER — PROCHLORPERAZINE EDISYLATE 5 MG/ML
5 INJECTION INTRAMUSCULAR; INTRAVENOUS
Status: DISCONTINUED | OUTPATIENT
Start: 2024-01-24 | End: 2024-01-24 | Stop reason: HOSPADM

## 2024-01-24 RX ORDER — GABAPENTIN 100 MG/1
100 CAPSULE ORAL DAILY
Status: DISCONTINUED | OUTPATIENT
Start: 2024-01-24 | End: 2024-01-28

## 2024-01-24 RX ORDER — GABAPENTIN 100 MG/1
200 CAPSULE ORAL NIGHTLY
Status: DISCONTINUED | OUTPATIENT
Start: 2024-01-24 | End: 2024-01-28

## 2024-01-24 RX ORDER — HYDRALAZINE HYDROCHLORIDE 20 MG/ML
10 INJECTION INTRAMUSCULAR; INTRAVENOUS
Status: DISCONTINUED | OUTPATIENT
Start: 2024-01-24 | End: 2024-01-24 | Stop reason: HOSPADM

## 2024-01-24 RX ORDER — PROPOFOL 10 MG/ML
INJECTION, EMULSION INTRAVENOUS PRN
Status: DISCONTINUED | OUTPATIENT
Start: 2024-01-24 | End: 2024-01-24 | Stop reason: SDUPTHER

## 2024-01-24 RX ORDER — FENTANYL CITRATE 50 UG/ML
INJECTION, SOLUTION INTRAMUSCULAR; INTRAVENOUS PRN
Status: DISCONTINUED | OUTPATIENT
Start: 2024-01-24 | End: 2024-01-24 | Stop reason: SDUPTHER

## 2024-01-24 RX ORDER — DEXAMETHASONE SODIUM PHOSPHATE 10 MG/ML
INJECTION, SOLUTION INTRAMUSCULAR; INTRAVENOUS PRN
Status: DISCONTINUED | OUTPATIENT
Start: 2024-01-24 | End: 2024-01-24 | Stop reason: SDUPTHER

## 2024-01-24 RX ORDER — SODIUM CHLORIDE 0.9 % (FLUSH) 0.9 %
5-40 SYRINGE (ML) INJECTION EVERY 12 HOURS SCHEDULED
Status: DISCONTINUED | OUTPATIENT
Start: 2024-01-24 | End: 2024-01-24 | Stop reason: HOSPADM

## 2024-01-24 RX ORDER — POTASSIUM CHLORIDE 20 MEQ/1
20 TABLET, EXTENDED RELEASE ORAL NIGHTLY
Status: DISCONTINUED | OUTPATIENT
Start: 2024-01-24 | End: 2024-01-28

## 2024-01-24 RX ORDER — DEXTRAN, HYPROMELLOSE 2910 .001; .003 ML/ML; ML/ML
1 SOLUTION OPHTHALMIC 2 TIMES DAILY
COMMUNITY

## 2024-01-24 RX ORDER — HALOPERIDOL 5 MG/ML
1 INJECTION INTRAMUSCULAR
Status: DISCONTINUED | OUTPATIENT
Start: 2024-01-24 | End: 2024-01-24 | Stop reason: HOSPADM

## 2024-01-24 RX ORDER — ANTIOX #8/OM3/DHA/EPA/LUT/ZEAX 250-2.5 MG
CAPSULE ORAL DAILY
COMMUNITY

## 2024-01-24 RX ORDER — LIDOCAINE HYDROCHLORIDE 20 MG/ML
INJECTION, SOLUTION INTRAVENOUS PRN
Status: DISCONTINUED | OUTPATIENT
Start: 2024-01-24 | End: 2024-01-24 | Stop reason: SDUPTHER

## 2024-01-24 RX ORDER — IPRATROPIUM BROMIDE AND ALBUTEROL SULFATE 2.5; .5 MG/3ML; MG/3ML
1 SOLUTION RESPIRATORY (INHALATION)
Status: DISCONTINUED | OUTPATIENT
Start: 2024-01-24 | End: 2024-01-24 | Stop reason: HOSPADM

## 2024-01-24 RX ORDER — AMLODIPINE BESYLATE AND BENAZEPRIL HYDROCHLORIDE 5; 10 MG/1; MG/1
1 CAPSULE ORAL DAILY
Status: DISCONTINUED | OUTPATIENT
Start: 2024-01-24 | End: 2024-01-28

## 2024-01-24 RX ORDER — SODIUM CHLORIDE 9 MG/ML
INJECTION, SOLUTION INTRAVENOUS CONTINUOUS PRN
Status: DISCONTINUED | OUTPATIENT
Start: 2024-01-24 | End: 2024-01-24

## 2024-01-24 RX ORDER — CHOLESTYRAMINE LIGHT 4 G/5.7G
4 POWDER, FOR SUSPENSION ORAL DAILY
Status: DISCONTINUED | OUTPATIENT
Start: 2024-01-24 | End: 2024-01-28

## 2024-01-24 RX ORDER — FUROSEMIDE 40 MG/1
40 TABLET ORAL
Status: DISCONTINUED | OUTPATIENT
Start: 2024-01-24 | End: 2024-01-28

## 2024-01-24 RX ORDER — DIPHENHYDRAMINE HYDROCHLORIDE 50 MG/ML
12.5 INJECTION INTRAMUSCULAR; INTRAVENOUS
Status: DISCONTINUED | OUTPATIENT
Start: 2024-01-24 | End: 2024-01-24 | Stop reason: HOSPADM

## 2024-01-24 RX ORDER — ONDANSETRON 2 MG/ML
INJECTION INTRAMUSCULAR; INTRAVENOUS PRN
Status: DISCONTINUED | OUTPATIENT
Start: 2024-01-24 | End: 2024-01-24 | Stop reason: SDUPTHER

## 2024-01-24 RX ORDER — CHOLESTYRAMINE LIGHT 4 G/5.7G
4 POWDER, FOR SUSPENSION ORAL DAILY
COMMUNITY

## 2024-01-24 RX ORDER — SODIUM CHLORIDE 9 MG/ML
INJECTION, SOLUTION INTRAVENOUS PRN
Status: DISCONTINUED | OUTPATIENT
Start: 2024-01-24 | End: 2024-01-24 | Stop reason: HOSPADM

## 2024-01-24 RX ORDER — LABETALOL HYDROCHLORIDE 5 MG/ML
10 INJECTION, SOLUTION INTRAVENOUS
Status: DISCONTINUED | OUTPATIENT
Start: 2024-01-24 | End: 2024-01-24 | Stop reason: HOSPADM

## 2024-01-24 RX ORDER — HYDROMORPHONE HYDROCHLORIDE 1 MG/ML
0.25 INJECTION, SOLUTION INTRAMUSCULAR; INTRAVENOUS; SUBCUTANEOUS EVERY 5 MIN PRN
Status: DISCONTINUED | OUTPATIENT
Start: 2024-01-24 | End: 2024-01-24 | Stop reason: HOSPADM

## 2024-01-24 RX ORDER — HYDROMORPHONE HYDROCHLORIDE 1 MG/ML
0.5 INJECTION, SOLUTION INTRAMUSCULAR; INTRAVENOUS; SUBCUTANEOUS EVERY 5 MIN PRN
Status: DISCONTINUED | OUTPATIENT
Start: 2024-01-24 | End: 2024-01-24 | Stop reason: HOSPADM

## 2024-01-24 RX ORDER — GABAPENTIN 100 MG/1
200 CAPSULE ORAL NIGHTLY
COMMUNITY

## 2024-01-24 RX ORDER — SODIUM CHLORIDE 0.9 % (FLUSH) 0.9 %
5-40 SYRINGE (ML) INJECTION PRN
Status: DISCONTINUED | OUTPATIENT
Start: 2024-01-24 | End: 2024-01-24 | Stop reason: HOSPADM

## 2024-01-24 RX ORDER — ROCURONIUM BROMIDE 10 MG/ML
INJECTION, SOLUTION INTRAVENOUS PRN
Status: DISCONTINUED | OUTPATIENT
Start: 2024-01-24 | End: 2024-01-24 | Stop reason: SDUPTHER

## 2024-01-24 RX ORDER — GABAPENTIN 100 MG/1
100 CAPSULE ORAL DAILY
COMMUNITY

## 2024-01-24 RX ADMIN — FENTANYL CITRATE 50 MCG: 50 INJECTION, SOLUTION INTRAMUSCULAR; INTRAVENOUS at 14:57

## 2024-01-24 RX ADMIN — SODIUM CHLORIDE, POTASSIUM CHLORIDE, SODIUM LACTATE AND CALCIUM CHLORIDE: 600; 310; 30; 20 INJECTION, SOLUTION INTRAVENOUS at 17:18

## 2024-01-24 RX ADMIN — ROCURONIUM BROMIDE 50 MG: 10 INJECTION, SOLUTION INTRAVENOUS at 14:57

## 2024-01-24 RX ADMIN — SODIUM CHLORIDE, PRESERVATIVE FREE 10 ML: 5 INJECTION INTRAVENOUS at 20:00

## 2024-01-24 RX ADMIN — GABAPENTIN 200 MG: 100 CAPSULE ORAL at 19:56

## 2024-01-24 RX ADMIN — LEVOTHYROXINE SODIUM 75 MCG: 0.07 TABLET ORAL at 05:53

## 2024-01-24 RX ADMIN — CARBOXYMETHYLCELLULOSE SODIUM, GLYCERIN, AND POLYSORBATE 80 1 DROP: 5; 10; 5 SOLUTION/ DROPS OPHTHALMIC at 19:55

## 2024-01-24 RX ADMIN — FENTANYL CITRATE 25 MCG: 50 INJECTION, SOLUTION INTRAMUSCULAR; INTRAVENOUS at 15:43

## 2024-01-24 RX ADMIN — SODIUM CHLORIDE, POTASSIUM CHLORIDE, SODIUM LACTATE AND CALCIUM CHLORIDE: 600; 310; 30; 20 INJECTION, SOLUTION INTRAVENOUS at 14:51

## 2024-01-24 RX ADMIN — PHENYLEPHRINE HYDROCHLORIDE 50 MCG: 10 INJECTION INTRAVENOUS at 16:25

## 2024-01-24 RX ADMIN — PHENYLEPHRINE HYDROCHLORIDE 100 MCG: 10 INJECTION INTRAVENOUS at 16:34

## 2024-01-24 RX ADMIN — LIDOCAINE HYDROCHLORIDE 50 MG: 20 INJECTION, SOLUTION INTRAVENOUS at 14:57

## 2024-01-24 RX ADMIN — PHENYLEPHRINE HYDROCHLORIDE 100 MCG: 10 INJECTION INTRAVENOUS at 17:12

## 2024-01-24 RX ADMIN — PHENYLEPHRINE HYDROCHLORIDE 50 MCG: 10 INJECTION INTRAVENOUS at 16:27

## 2024-01-24 RX ADMIN — FENTANYL CITRATE 50 MCG: 50 INJECTION, SOLUTION INTRAMUSCULAR; INTRAVENOUS at 14:54

## 2024-01-24 RX ADMIN — OXYCODONE HYDROCHLORIDE 5 MG: 5 TABLET ORAL at 02:27

## 2024-01-24 RX ADMIN — FENTANYL CITRATE 25 MCG: 50 INJECTION, SOLUTION INTRAMUSCULAR; INTRAVENOUS at 15:51

## 2024-01-24 RX ADMIN — PROPOFOL 100 MG: 10 INJECTION, EMULSION INTRAVENOUS at 14:57

## 2024-01-24 RX ADMIN — OXYCODONE HYDROCHLORIDE 5 MG: 5 TABLET ORAL at 19:56

## 2024-01-24 RX ADMIN — CARBOXYMETHYLCELLULOSE SODIUM, GLYCERIN, AND POLYSORBATE 80 1 DROP: 5; 10; 5 SOLUTION/ DROPS OPHTHALMIC at 08:10

## 2024-01-24 RX ADMIN — GABAPENTIN 200 MG: 100 CAPSULE ORAL at 02:31

## 2024-01-24 RX ADMIN — ONDANSETRON 4 MG: 2 INJECTION INTRAMUSCULAR; INTRAVENOUS at 17:13

## 2024-01-24 RX ADMIN — CEFAZOLIN 2000 MG: 2 INJECTION, POWDER, FOR SOLUTION INTRAMUSCULAR; INTRAVENOUS at 15:10

## 2024-01-24 RX ADMIN — FENTANYL CITRATE 50 MCG: 50 INJECTION, SOLUTION INTRAMUSCULAR; INTRAVENOUS at 16:17

## 2024-01-24 RX ADMIN — FENTANYL CITRATE 50 MCG: 50 INJECTION, SOLUTION INTRAMUSCULAR; INTRAVENOUS at 16:56

## 2024-01-24 RX ADMIN — PANTOPRAZOLE SODIUM 40 MG: 40 TABLET, DELAYED RELEASE ORAL at 19:56

## 2024-01-24 RX ADMIN — DEXAMETHASONE SODIUM PHOSPHATE 10 MG: 10 INJECTION INTRAMUSCULAR; INTRAVENOUS at 14:57

## 2024-01-24 RX ADMIN — OXYCODONE HYDROCHLORIDE 10 MG: 5 TABLET ORAL at 08:09

## 2024-01-24 ASSESSMENT — PAIN SCALES - GENERAL
PAINLEVEL_OUTOF10: 9
PAINLEVEL_OUTOF10: 10
PAINLEVEL_OUTOF10: 6
PAINLEVEL_OUTOF10: 7
PAINLEVEL_OUTOF10: 8

## 2024-01-24 ASSESSMENT — PAIN DESCRIPTION - DESCRIPTORS
DESCRIPTORS: SHARP;SHOOTING;THROBBING
DESCRIPTORS: SHARP
DESCRIPTORS: ACHING

## 2024-01-24 ASSESSMENT — PAIN DESCRIPTION - LOCATION
LOCATION: BACK;LEG
LOCATION: LEG
LOCATION: LEG

## 2024-01-24 ASSESSMENT — PAIN DESCRIPTION - ORIENTATION
ORIENTATION: LEFT

## 2024-01-24 NOTE — ANESTHESIA PRE PROCEDURE
Department of Anesthesiology  Preprocedure Note       Name:  Josie Dominguez   Age:  88 y.o.  :  1935                                          MRN:  [id number]         Date:  2024      Surgeon: Surgeon(s):  Arthur Little MD    Procedure: ** CALL WITH TIME** LEFT FEMUR OPEN REDUCTION INTERNAL FIXATION (Left)    Medications prior to admission:   Prior to Admission medications    Medication Sig Start Date End Date Taking? Authorizing Provider   gabapentin (NEURONTIN) 100 MG capsule Take 2 capsules by mouth at bedtime.   Yes Gi Vera MD   gabapentin (NEURONTIN) 100 MG capsule Take 1 capsule by mouth daily.   Yes Gi Vera MD   cholestyramine light 4 g packet Take 1 packet by mouth daily   Yes Gi Vera MD   Multiple Vitamins-Minerals (PRESERVISION AREDS 2) CAPS Take by mouth daily   Yes Gi Vera MD   dextran 70-hypromellose (TEARS PURE) 0.1-0.3 % SOLN opthalmic solution Place 1 drop into both eyes 2 times daily   Yes Gi Vera MD   Cholecalciferol (VITAMIN D) 2000 units TABS tablet Take 1 tablet by mouth nightly    Gi Vera MD   potassium chloride (KLOR-CON M) 20 MEQ extended release tablet Take 1 tablet by mouth nightly    Gi Vera MD   furosemide (LASIX) 20 MG tablet Take 2 tablets by mouth every morning (before breakfast)    Gi Vera MD   rivaroxaban (XARELTO) 20 MG TABS tablet Take 1 tablet by mouth nightly    Gi Vera MD   sertraline (ZOLOFT) 100 MG tablet Take 2 tablets by mouth daily    Gi Vera MD   Calcium Carb-Cholecalciferol (CALCIUM-VITAMIN D) 500-200 MG-UNIT per tablet Take 1 tablet by mouth 2 times daily (with meals)    Gi Vera MD   pantoprazole (PROTONIX) 40 MG tablet Take 1 tablet by mouth 2 times daily    Gi Vera MD   acetaminophen (TYLENOL) 325 MG tablet Take 650 mg by mouth every 4 hours as needed for Pain or Fever    Provider

## 2024-01-24 NOTE — H&P (VIEW-ONLY)
06/05/2012 02:36 PM    INR 1.4 01/24/2024 05:17 AM       Radiology Review:  X-ray left knee/femur demonstrates acute supracondylar femur fracture that appears to be shortened and in procurvatum.  Difficult to assess if fracture extends intra-articularly.    CT left femur/knee demonstrates fracture noted above.  There does appear to be some comminution about the fracture site.  The fracture does appear to extend anteriorly to the articular surface.  No other acute fracture dislocation appreciated    X-ray AP pelvis/bilateral hips demonstrates no acute fracture dislocation.  There is cortical irregularity about the left superior inferior pubic rami consistent with old injury.  Patient is status post kyphoplasty.    IMPRESSION:  Closed, left supracondylar distal femur fracture    PLAN:  Physical exam and imaging finding discussed with patient and family bedside today.  We discussed operative treatment regarding her left distal femur fracture as well as the risks and benefits including but not limited to blood loss, infection, failure of fixation, nonunion, malunion, death.  Patient and family agreeable to plan.  All questions this time.  Nonweightbearing to left lower extremity, and knee immobilizer  N.p.o. effective now  Preoperative labs ordered  Procedure consent  Hold anticoagulation  Discussed with attending, plan for orthopedic surgical intervention on 1/24/2024      Electronically signed by Hema Yanez DO on 1/24/2024 at 6:54 AM  Note: This report was completed using computerCMGE voiced recognition software.  Every effort has been made to ensure accuracy; however, inadvertent computerized transcription errors may be present.        ATTENDING:  I reviewed with the resident the medical history and the resident's findings on the physical examination. I also reviewed the imaging. I discussed with the resident the patient's diagnosis and concur with the plan.     88 year old female with L distal femur fracture.  Hx  of stroke and foot drop on the left.  Uses legs for transfers only.  Mainly mobilizes with wheelchair.  She is currently DNR but discussed with patient and family the suspension of DNR status during the periop period.    All questions answered to the best of my ability.    Plan:  To OR today for ORIF  NPO  Consent    The procedure of ORIF L distal femur fracture was discussed with the patient today. Preoperative and postoperative care was discussed in detail. Alternative nonsurgical treatment options were again reviewed. After discussing all options the patient wishes to proceed with surgical management. The patient voices understand of the procedure and agrees to proceed. All the patient's questions. Verbal consent was obtained.  The risks of ORIF L distal femur fracture were discussed in detail with the patient including but not limited to: infection (superficial and deep), DVT, PE, arthrofibrosis, persistent pain, chronic limb swelling, neurovascular injury, dislocation or subluxation, excessive bleeding, intraoperative and/or postoperative fracture, loosening/wear/failure of implants, possible need for further surgery, nonunion/malunion, cardiopulmonary and medical complications, amputation and death. The patient voices understand of the potential complications and wishes to proceed. The benefits and alternatives have been discussed.    Arthur Little MD

## 2024-01-24 NOTE — ANESTHESIA POSTPROCEDURE EVALUATION
Department of Anesthesiology  Postprocedure Note    Patient: Josie Dominguez  MRN: [id number]  YOB: 1935  Date of evaluation: 1/24/2024    Procedure Summary       Date: 01/24/24 Room / Location: [address]    Anesthesia Start: 1451 Anesthesia Stop: 1733    Procedure: ** CALL WITH TIME** LEFT FEMUR OPEN REDUCTION INTERNAL FIXATION (Left: Leg Upper) Diagnosis:       Closed displaced subtrochanteric fracture of left femur, initial encounter ([facility])      (Closed displaced subtrochanteric fracture of left femur, initial encounter ([facility]) [S72.22XA])    Surgeons: Arthur Little MD Responsible Provider: Hilton Byrd MD    Anesthesia Type: general ASA Status: 4            Anesthesia Type: No value filed.    Kandice Phase I: Kandice Score: 9    Kandice Phase II:      Anesthesia Post Evaluation    Patient location during evaluation: PACU  Patient participation: complete - patient participated  Level of consciousness: awake  Pain score: 0  Airway patency: patent  Nausea & Vomiting: no nausea and no vomiting  Cardiovascular status: hemodynamically stable  Respiratory status: acceptable  Hydration status: euvolemic  Pain management: adequate    No notable events documented.

## 2024-01-24 NOTE — H&P
Department of Internal Medicine  History and Physical    PCP: Clarence Orosco MD  Admitting Physician: Dr. Luz/Zechariah  Consultants:   Date of Service: 1/23/2024    CHIEF COMPLAINT: Left knee pain    HISTORY OF PRESENT ILLNESS:    Patient is 88-year-old female presented to ED from [facility] due to left hip pain.  Patient is poor historian.  As such HPI obtained from chart review.  According to ED note patient was transferred from [facility] approximately 2 days ago when she heard a crack.  She developed persistent pain so she presented to the ED for further evaluation and management.      PAST MEDICAL Hx:  Past Medical History:   Diagnosis Date    Acquired hypothyroidism     Arthritis     Blood transfusion     Cancer (HCC)     Uterine    COPD (chronic obstructive pulmonary disease) (HCC)     CVA, old, hemiparesis (HCC)     Depression     GERD (gastroesophageal reflux disease)     Hyperlipidemia     Hypertension     Muscle weakness     Osteoporosis     Other disorders of kidney and ureter     Paroxysmal atrial fibrillation (HCC)     Thyroid disease     Unspecified cerebral artery occlusion with cerebral infarction     At age 45, residual left side weakness       PAST SURGICAL Hx:   Past Surgical History:   Procedure Laterality Date    ABDOMEN SURGERY      APPENDECTOMY      BACK SURGERY  6/17/16    kypho T12 and L3 with bone biopsies    BRAIN SURGERY      COLON SURGERY      COLONOSCOPY      ENDOSCOPY, COLON, DIAGNOSTIC      FIXATION KYPHOPLASTY  09/28/2017    L2, L4, L5 with Bone Biopsy & Epidural Injection    TONSILLECTOMY      UPPER GASTROINTESTINAL ENDOSCOPY N/A 5/9/2019    EGD BIOPSY performed by Rg Delcid MD at [facility] ENDOSCOPY       FAMILY Hx:  Family History   Problem Relation Age of Onset    Other Mother     Heart Disease Father     Cancer Sister     Depression Brother     COPD Brother     Cancer Brother        HOME MEDICATIONS:  Prior to Admission medications    Medication Sig Start Date  tablet Take 1 tablet by mouth every 8 hours as needed for Pain  Patient taking differently: Take 50 mg by mouth every 4 hours as needed for Pain.  5/21/16   Arthur Casillas, DO   Omega-3 Fatty Acids (FISH OIL) 1000 MG CAPS Take 1,000 mg by mouth daily.    Provider, MD Gi   amlodipine-benazepril (LOTREL) 5-10 MG per capsule Take 1 capsule by mouth daily.      Provider, MD Gi       ALLERGIES:  Codeine, Demerol, and Adhesive tape    SOCIAL Hx:  Social History     Socioeconomic History    Marital status:      Spouse name: Not on file    Number of children: Not on file    Years of education: Not on file    Highest education level: Not on file   Occupational History    Not on file   Tobacco Use    Smoking status: Never    Smokeless tobacco: Never   Vaping Use    Vaping Use: Not on file   Substance and Sexual Activity    Alcohol use: No    Drug use: Not on file    Sexual activity: Not on file   Other Topics Concern    Not on file   Social History Narrative    Not on file     Social Determinants of Health     Financial Resource Strain: Not on file   Food Insecurity: Not on file   Transportation Needs: Not on file   Physical Activity: Not on file   Stress: Not on file   Social Connections: Not on file   Intimate Partner Violence: Not on file   Housing Stability: Not on file       ROS: Positive in bold  General:   Denies chills, fatigue, fever, malaise, night sweats or weight loss    Psychological:   Denies anxiety, disorientation or hallucinations    ENT:    Denies epistaxis, headaches, vertigo or visual changes    Cardiovascular:   Denies any chest pain, irregular heartbeats, or palpitations. No paroxysmal nocturnal dyspnea.    Respiratory:   Denies shortness of breath, coughing, sputum production, hemoptysis, or wheezing.  No orthopnea.    Gastrointestinal:   Denies nausea, vomiting, diarrhea, or constipation.  Denies any abdominal pain.  Denies change in bowel habits or stools.

## 2024-01-24 NOTE — PROGRESS NOTES
4 Eyes Skin Assessment     NAME:  Josie Dominguez  YOB: 1935  MEDICAL RECORD NUMBER:  [id number]    The patient is being assessed for  Admission    I agree that at least one RN has performed a thorough Head to Toe Skin Assessment on the patient. ALL assessment sites listed below have been assessed.      Areas assessed by both nurses:    {Pressure Areas Assessed:45442}        Does the Patient have a Wound? {Action Wound:68821}       Gonzalez Prevention initiated by RN: Yes  Wound Care Orders initiated by RN: Yes    Pressure Injury (Stage 3,4, Unstageable, DTI, NWPT, and Complex wounds) if present, place Wound referral order by RN under : No    New Ostomies, if present place, Ostomy referral order under : No     Nurse 1 eSignature: Electronically signed by Darrius Iverson RN on 1/24/24 at 12:41 AM EST    **SHARE this note so that the co-signing nurse can place an eSignature**    Nurse 2 eSignature: {Esignature:[id number]}

## 2024-01-24 NOTE — PROGRESS NOTES
Date:2024  Patient Name: Josie Dominguez  MRN: [id number]  : 1935  ROOM #: 0330/0330-02    Occupational Therapy on hold due to awaiting orthopedic surgery (Closed, left supracondylar distal femur fracture); please reorder O.T. EVAL and TREAT when the patient is able to participate in therapy. Thank you.     Hue Snyder OTR/L #[id number]

## 2024-01-24 NOTE — DISCHARGE INSTRUCTIONS
Orthopedics  Weight bearing Status - Non-weight bearing - left leg. Ok for 50% weight bearing for transfers only.   Pain medication Per Prescription  Ice and Elevate effected Extremity  Continue DVT Prophylaxis home xeralto  Wound care -  ok to remove dressing on post operative day 7. Cover with a clean dry dressing as needed for drainage.   Fracture Care - continue hinged knee brace.   Follow Up in Office in 2 weeks with Dr. Little. Call the office to schedule an appointment.

## 2024-01-24 NOTE — CONSULTS
Department of Orthopedic Surgery  Resident Consult Note          Reason for Consult: Left knee pain    HISTORY OF PRESENT ILLNESS:       Patient is a 88 y.o. female who presents with left knee pain following an incident that occurred at her nursing facility yesterday.  Patient with daughter at bedside states that she was being transferred from her wheelchair to her bed when she heard her leg snap had immediate left knee pain.  Patient does have history of stroke with left-sided deficits including left-sided foot drop.  Denies numbness/tingling/paresthesias.  Denies any other orthopedic complaints at this time.  Patient is on Xarelto last taken 1/22.  Patient is nonambulatory mainly wheelchair-bound.    Past Medical History:        Diagnosis Date    Acquired hypothyroidism     Arthritis     Blood transfusion     Cancer (HCC)     Uterine    COPD (chronic obstructive pulmonary disease) (HCC)     CVA, old, hemiparesis (HCC)     Depression     GERD (gastroesophageal reflux disease)     Hyperlipidemia     Hypertension     Muscle weakness     Osteoporosis     Other disorders of kidney and ureter     Paroxysmal atrial fibrillation (HCC)     Thyroid disease     Unspecified cerebral artery occlusion with cerebral infarction     At age 45, residual left side weakness     Past Surgical History:        Procedure Laterality Date    ABDOMEN SURGERY      APPENDECTOMY      BACK SURGERY  6/17/16    kypho T12 and L3 with bone biopsies    BRAIN SURGERY      COLON SURGERY      COLONOSCOPY      ENDOSCOPY, COLON, DIAGNOSTIC      FIXATION KYPHOPLASTY  09/28/2017    L2, L4, L5 with Bone Biopsy & Epidural Injection    TONSILLECTOMY      UPPER GASTROINTESTINAL ENDOSCOPY N/A 5/9/2019    EGD BIOPSY performed by Rg Delicd MD at [facility] ENDOSCOPY     Current Medications:   Current Facility-Administered Medications: cholestyramine light packet 4 g, 4 g, Oral, Daily  Carboxymeth-Glycerin-Polysorb 0.5-1-0.5 % SOLN 1 drop, 1 drop, Both Eyes,  of stroke and foot drop on the left.  Uses legs for transfers only.  Mainly mobilizes with wheelchair.  She is currently DNR but discussed with patient and family the suspension of DNR status during the periop period.    All questions answered to the best of my ability.    Plan:  To OR today for ORIF  NPO  Consent    The procedure of ORIF L distal femur fracture was discussed with the patient today. Preoperative and postoperative care was discussed in detail. Alternative nonsurgical treatment options were again reviewed. After discussing all options the patient wishes to proceed with surgical management. The patient voices understand of the procedure and agrees to proceed. All the patient's questions. Verbal consent was obtained.  The risks of ORIF L distal femur fracture were discussed in detail with the patient including but not limited to: infection (superficial and deep), DVT, PE, arthrofibrosis, persistent pain, chronic limb swelling, neurovascular injury, dislocation or subluxation, excessive bleeding, intraoperative and/or postoperative fracture, loosening/wear/failure of implants, possible need for further surgery, nonunion/malunion, cardiopulmonary and medical complications, amputation and death. The patient voices understand of the potential complications and wishes to proceed. The benefits and alternatives have been discussed.    Arthur Little MD

## 2024-01-24 NOTE — OP NOTE
Operative Note      Patient: Josie Dominguez  YOB: 1935  MRN: [id number]    Date of Procedure: 1/24/2024    Pre-op Diagnosis:  Left distal supracondylar femur fracture    Post-Op Diagnosis: Same       Procedure:  Open Reduction and Internal Fixation Left Distal femur fracture with retrograde femoral nail    Surgeon(s):  Arthur Little MD    Assistant:   Resident: Richy Nation DO Justin Griffith, DO Resident    Anesthesia: General    Estimated Blood Loss (mL): 150    Complications: None    Specimens:   None    Implants:  Implant Name Type Inv. Item Serial No.  Lot No. LRB No. Used Action   SCREW BNE ADV LCK [id number] - [id number]  SCREW BNE ADV LCK [id number]  [employer] [facility] [id number] Left 1 Implanted   SCREW BNE LCK 5X80 MM ADV STRL - [id number]  SCREW BNE LCK 5X80 MM ADV STRL  [employer] [facility] [id number] Left 1 Implanted   SCREW BNE ADV LCK [id number] - [id number]  SCREW BNE ADV LCK [id number]  [employer] [facility] [id number] Left 1 Implanted   SCREW LK 5.0X37MM - [id number]  SCREW LK 5.0X37MM  [employer] [facility] [id number] Left 1 Implanted   SCREW LK 5X40MM - [id number]  SCREW LK 5X40MM  [employer] [facility] [id number] Left 1 Implanted         Drains: None    Findings: Left supracondylar distal femur fracture    Detailed Description of Procedure:   Indications:  This is an 88-year-old female who presented to the hospital with left thigh pain.  She was at her facility when she was performing a transfer and subsequently had pain in her left distal femur area.  X-rays demonstrated a supracondylar femur fracture of the distal.  She has a history of a stroke and weakness on her left lower extremity including foot drop.  She also has contraction of her left upper extremity.  She mainly mobilizes with with a wheelchair.  She does pivot transfer. I discussed risk, benefits, and alternatives to retrograde femoral nailing versus plating.  Risks include not  mm.  We then sequentially reamed to 14-1/2 mm reamer for a 13mm nail.  We then passed the nail.  While holding our reduction we placed our distal locking screws.  Due to the very distal nature we able to get 3 locking screws placed.  We then used a perfect [place] technique to place 2 proximal interlocking screws.  We obtained AP and lateral x-rays of our construct and I was satisfied with our reduction.  She did have some medial cortical comminution.  We felt as though this was acceptable.  Our lateral side of the femur was well aligned as well as her lateral x-ray administrated excellent alignment.  We then scrutinized the femoral neck to ensure that there were no additional femoral neck fractures.  All the areas were copiously irrigated.  The patellar tendon and distal IT band was closed with 0 Vicryl.  Skin was closed with 0 Vicryl 2-0 Vicryl and staples.  Patient was dressed in Aquacel dressing and a bulky dressing over top.  She awoke from anesthesia without difficulty.  She was transferred to PACU in stable condition.  All needle sponge instrument counts were correct and procedure.    Post op plan:  ABX for 24 hours  50% WB with transfers  DVT proph    Electronically signed by Arthur Little MD on 1/24/2024 at 5:29 PM

## 2024-01-24 NOTE — PROGRESS NOTES
Internal Medicine Progress Note    [facility]    Blane Luz D.O., ANMOL.                    Edward Apple D.O., ANMOL.                             Kirt Reina D.O.       Iliana Booth, MSN, APRN, NP-C  Bhaskar Boldne, MSN, APRN-CNP  Nigel Torre, MSN, APRN-CNP     Primary Care Physician: Clarence Orosco MD   Admitting Physician:  Blane Luz DO  Admission date and time: 1/23/2024  6:15 PM    Room:  OR [place]/NONE  Admitting diagnosis: Other fracture of left femur, initial encounter for closed fracture (HCC) [S72.8X2A]    Patient Name: Josie Dominguez  MRN: [id number]    Date of Service: 1/24/2024     Subjective:  Josie is a 88 y.o. female who was seen and examined today,1/24/2024, at the bedside.  Patient resting comfortably at the bedside.  Patient scheduled for surgery today.  Patient does have pain of the hip site area.  Denies chest pain or shortness of breath    Daughter Adriane present during my examination.    Review of System:   Constitutional:   Denies fever or chills, weight loss or gain, fatigue or malaise.  HEENT:   Denies ear pain, sore throat, sinus or eye problems.  Cardiovascular:   Denies any chest pain, irregular heartbeats, or palpitations.   Respiratory:   Denies shortness of breath, coughing, sputum production, hemoptysis, or wheezing.  Gastrointestinal:   Denies nausea, vomiting, diarrhea, or constipation.  Denies any abdominal pain.  Genitourinary:    Denies any urgency, frequency, hematuria. Voiding  without difficulty.  Extremities:   Denies lower extremity swelling, edema or cyanosis.  Pain in left hip area  Neurology:    Denies any headache or focal neurological deficits, Denies generalized weakness or memory difficulty.   Psch:   Denies being anxious or depressed.  Musculoskeletal:    Denies  myalgias, joint complaints or back pain.   Integumentary:   Denies any rashes, ulcers, or excoriations.  Denies

## 2024-01-24 NOTE — PROGRESS NOTES
Physical Therapy Initial Evaluation/Plan of Care    Room #:  0330/0330-02  Patient Name: Josie Dominguez  YOB: 1935  MRN: [id number]    Date of Service: 1/25/2024     Tentative placement recommendation: Subacute Rehab  Equipment recommendation: To be determined      Evaluating Physical Therapist: Irasema Regalado, PT #[id number]      Specific Provider Orders/Date/Referring Provider :  01/23/24 2030    PT eval and treat  Start:  01/23/24 2030,   End:  01/23/24 2030,   ONE TIME,   Standing Count:  1 Occurrences,   R       Last continued at transfer on Wed Jan 24, 2024  6:29 PM  Deejay Barber DO    Admitting Diagnosis:   Other fracture of left femur, initial encounter for closed fracture ([facility]) [S72.8X2A]     Patient was transferred from bed approximately 2 days ago when she heard a crack.  Patient was sent in today for persistent pain.   Surgery:   Date of Procedure: 1/24/2024   Procedure:  Open Reduction and Internal Fixation Left Distal femur fracture with retrograde femoral nail     Surgeon(s):  Arthur Little MD  Visit Diagnoses         Codes    Closed displaced fracture of condyle of left femur, initial encounter ([facility])    -  Primary S72.412A            Patient Active Problem List   Diagnosis    Osteoporosis    Vertigo    Acquired hypothyroidism    CVA, old, hemiparesis (HCC)    Hyperlipidemia    Hypertension    Cerebral artery occlusion with cerebral infarction (HCC)    Maxillary sinusitis    Depression    Paroxysmal atrial fibrillation (HCC)    Vitamin D insufficiency    Closed compression fracture of third lumbar vertebra (HCC)    Closed compression fracture of lumbar vertebra (HCC)    Constipation    Congenital spondylolisthesis of lumbar region    Spinal stenosis, lumbar region, without neurogenic claudication    Thoracic back pain    Compression fracture of lumbar vertebra (HCC)    Right lower quadrant abdominal pain    GI bleed    Other fracture of left femur, initial encounter for closed  further education in this area   Yes Partial Yes      Treatment:  Patient practiced and was instructed/facilitated in the following treatment: Patient assisted to edge of bed,   Sat edge of bed 10 minutes with Maximal progressing to moderate assist to increase dynamic sitting balance and activity tolerance. Returned to bed, Dependent of  2 to scoot to Head of bed in trendelenburg    . Placed in semi chair position with wedges and pillows for midline support.    Therapeutic Exercises:  not performed  x   reps.       At end of session, patient in bed in chair position with alarm call light and phone within reach,  all lines and tubes intact, nursing notified.      Patient would benefit from continued skilled Physical Therapy to improve functional independence and quality of life.         Patient's/ family goals   none stated    Time in  0902  Time out  0922    Total Treatment Time  0 minutes    Evaluation time includes thorough review of current medical information, gathering information on past medical history/social history and prior level of function, completion of standardized testing/informal observation of tasks, assessment of data, and development of Plan of care and goals.     CPT codes:  Low Complexity PT evaluation (79563)  No treatment    Irasema Regalado, PT

## 2024-01-24 NOTE — ED PROVIDER NOTES
time range)     Or   oxyCODONE (ROXICODONE) immediate release tablet 10 mg (has no administration in time range)   glucose chewable tablet 16 g (has no administration in time range)   dextrose bolus 10% 125 mL (has no administration in time range)     Or   dextrose bolus 10% 250 mL (has no administration in time range)   glucagon injection 1 mg (has no administration in time range)   dextrose 10 % infusion (has no administration in time range)   sodium chloride flush 0.9 % injection 5-40 mL (has no administration in time range)   sodium chloride flush 0.9 % injection 5-40 mL (has no administration in time range)   0.9 % sodium chloride infusion (has no administration in time range)   potassium chloride (KLOR-CON M) extended release tablet 40 mEq (has no administration in time range)     Or   potassium bicarb-citric acid (EFFER-K) effervescent tablet 40 mEq (has no administration in time range)     Or   potassium chloride 10 mEq/100 mL IVPB (Peripheral Line) (has no administration in time range)   magnesium sulfate 2000 mg in 50 mL IVPB premix (has no administration in time range)   polyethylene glycol (GLYCOLAX) packet 17 g (has no administration in time range)   acetaminophen (TYLENOL) tablet 650 mg (has no administration in time range)     Or   acetaminophen (TYLENOL) suppository 650 mg (has no administration in time range)   levothyroxine (SYNTHROID) tablet 75 mcg (has no administration in time range)   pantoprazole (PROTONIX) tablet 40 mg (has no administration in time range)   sertraline (ZOLOFT) tablet 100 mg (has no administration in time range)   sodium chloride 0.9 % bolus 1,000 mL (1,000 mLs IntraVENous New Bag 1/23/24 2319)   traMADol (ULTRAM) tablet 50 mg (50 mg Oral Given 1/23/24 1956)       Medical Decision Making/Differential Diagnosis:  CC/HPI Summary, Social Determinants of health, Records Reviewed, DDx, testing done/not done, ED Course, Reassessment, disposition considerations/shared decision  making with patient, consults, disposition:        CC/HPI Summary, DDx, ED Course, Reassessment, Tests Considered, Patient expectation: Patient presents emergency department for evaluation of leg injury.  Patient has evidence of a distal femur fracture.  CT was ordered of the recommendations of orthopedic surgery.  Patient's leg was placed in knee immobilizer as well as wrapped in Ace wrap at the recommendation of surgery.  Patient had soft and compressible compartments following the procedure.  Patient also had pulses palpable need dorsalis pedis region.  Patient has no evidence of an open fracture.  There is no evidence of compartment syndrome.  There is no evidence of neurovascular compromise.  Patient will be admitted to medicine for management.  ED Course as of 01/24/24 0031 Tue Jan 23, 2024 1939 Bilateral hip and pelvis x-ray interpreted by me, no acute findings [HH]   1939 Left femur x-ray interpreted by me, distal femur fracture [HH]      ED Course User Index  [HH] Kristine Gann, DO            Chronic Conditions:   Past Medical History:   Diagnosis Date    Acquired hypothyroidism     Arthritis     Blood transfusion     Cancer (HCC)     Uterine    COPD (chronic obstructive pulmonary disease) (HCC)     CVA, old, hemiparesis (HCC)     Depression     GERD (gastroesophageal reflux disease)     Hyperlipidemia     Hypertension     Muscle weakness     Osteoporosis     Other disorders of kidney and ureter     Paroxysmal atrial fibrillation (HCC)     Thyroid disease     Unspecified cerebral artery occlusion with cerebral infarction     At age 45, residual left side weakness         Records Reviewed: Note from 5/8/2019 for patient past medical history    CONSULTS: (Who and What was discussed)  IP CONSULT TO ORTHOPEDIC SURGERY  Spoke to the resident on-call who recommended knee immobilizer as well as Ace wrap  Spoke to Dr. Armas who is the admitting provider for [facility].  He agreed to accept this patient.    FINAL

## 2024-01-24 NOTE — ACP (ADVANCE CARE PLANNING)
Advance Care Planning   Healthcare Decision Maker:    Primary Decision Maker: Seble Burt [facility] - [phone]    Click here to complete Healthcare Decision Makers including selection of the Healthcare Decision Maker Relationship (ie \"Primary\").  Today we documented Decision Maker(s) consistent with ACP documents on file.

## 2024-01-24 NOTE — PROGRESS NOTES
Physician Progress Note      PATIENT:               ROSALES TALBERT  [facility] #:                  [id number]  :                       1935  ADMIT DATE:       2024 6:15 PM  DISCH DATE:  RESPONDING  PROVIDER #:        Blane Luz DO          QUERY TEXT:    Dear ,    Pt admitted with left distal femur fracture. Pt noted to have Osteoporosis in   H&P dated . If possible, please document in progress notes and discharge   summary if you are evaluating and/or treating any of the following:    The medical record reflects the following:  Risk Factors: osteoporosis, COPD, GERD  Clinical Indicators: osteoporosis per hx; per H&P: \" According to ED note   patient was transferred from that approximately 2 days ago when she heard a   crack\"; per CT scan: \"low bone density\"  Treatment: sx intervention pending, home medications: Vitamin D 2,000 Units   nightly and Calcium BID    Thank You,  Shara De Leon RN, BSN, CDS  Clinical Documentation Improvement Specialist  Options provided:  -- Osteoporotic left distal femur fracture  -- Osteoporotic left distal femur fracture following transfer from bed which   would not usually break a normal, healthy bone  -- Traumatic left distal femur fracture  -- Other - I will add my own diagnosis  -- Disagree - Not applicable / Not valid  -- Disagree - Clinically unable to determine / Unknown  -- Refer to Clinical Documentation Reviewer    PROVIDER RESPONSE TEXT:    This patient has an osteoporotic left distal femur fracture.    Query created by: Shara De Leon on 2024 1:24 PM      Electronically signed by:  Blane Luz DO 2024 6:07 PM

## 2024-01-25 LAB
25(OH)D3 SERPL-MCNC: 30.2 NG/ML (ref 30–100)
ALBUMIN SERPL-MCNC: 3 G/DL (ref 3.5–5.2)
ALP SERPL-CCNC: 73 U/L (ref 35–104)
ALT SERPL-CCNC: 12 U/L (ref 0–32)
ANION GAP SERPL CALCULATED.3IONS-SCNC: 11 MMOL/L (ref 7–16)
AST SERPL-CCNC: 17 U/L (ref 0–31)
BASOPHILS # BLD: 0 K/UL (ref 0–0.2)
BASOPHILS NFR BLD: 0 % (ref 0–2)
BILIRUB SERPL-MCNC: 0.3 MG/DL (ref 0–1.2)
BUN SERPL-MCNC: 29 MG/DL (ref 6–23)
CALCIUM SERPL-MCNC: 8.1 MG/DL (ref 8.6–10.2)
CHLORIDE SERPL-SCNC: 102 MMOL/L (ref 98–107)
CO2 SERPL-SCNC: 22 MMOL/L (ref 22–29)
CREAT SERPL-MCNC: 1.2 MG/DL (ref 0.5–1)
EOSINOPHIL # BLD: 0 K/UL (ref 0.05–0.5)
EOSINOPHILS RELATIVE PERCENT: 0 % (ref 0–6)
ERYTHROCYTE [DISTWIDTH] IN BLOOD BY AUTOMATED COUNT: 14.3 % (ref 11.5–15)
GFR SERPL CREATININE-BSD FRML MDRD: 44 ML/MIN/1.73M2
GLUCOSE SERPL-MCNC: 131 MG/DL (ref 74–99)
HCT VFR BLD AUTO: 16.5 % (ref 34–48)
HCT VFR BLD AUTO: 26.1 % (ref 34–48)
HGB BLD-MCNC: 5.1 G/DL (ref 11.5–15.5)
HGB BLD-MCNC: 8.9 G/DL (ref 11.5–15.5)
LYMPHOCYTES NFR BLD: 0.85 K/UL (ref 1.5–4)
LYMPHOCYTES RELATIVE PERCENT: 5 % (ref 20–42)
MCH RBC QN AUTO: 30.9 PG (ref 26–35)
MCHC RBC AUTO-ENTMCNC: 30.9 G/DL (ref 32–34.5)
MCV RBC AUTO: 100 FL (ref 80–99.9)
MONOCYTES NFR BLD: 1.28 K/UL (ref 0.1–0.95)
MONOCYTES NFR BLD: 8 % (ref 2–12)
NEUTROPHILS NFR BLD: 87 % (ref 43–80)
NEUTS SEG NFR BLD: 14.17 K/UL (ref 1.8–7.3)
PLATELET # BLD AUTO: 208 K/UL (ref 130–450)
PMV BLD AUTO: 10.7 FL (ref 7–12)
POTASSIUM SERPL-SCNC: 5 MMOL/L (ref 3.5–5)
PROT SERPL-MCNC: 5.2 G/DL (ref 6.4–8.3)
RBC # BLD AUTO: 1.65 M/UL (ref 3.5–5.5)
RBC # BLD: ABNORMAL 10*6/UL
SODIUM SERPL-SCNC: 135 MMOL/L (ref 132–146)
TROPONIN I SERPL HS-MCNC: 23 NG/L (ref 0–9)
WBC OTHER # BLD: 16.3 K/UL (ref 4.5–11.5)

## 2024-01-25 PROCEDURE — 2580000003 HC RX 258: Performed by: ORTHOPAEDIC SURGERY

## 2024-01-25 PROCEDURE — 85018 HEMOGLOBIN: CPT

## 2024-01-25 PROCEDURE — 6360000002 HC RX W HCPCS

## 2024-01-25 PROCEDURE — 36415 COLL VENOUS BLD VENIPUNCTURE: CPT

## 2024-01-25 PROCEDURE — 6370000000 HC RX 637 (ALT 250 FOR IP): Performed by: ORTHOPAEDIC SURGERY

## 2024-01-25 PROCEDURE — 6360000002 HC RX W HCPCS: Performed by: ORTHOPAEDIC SURGERY

## 2024-01-25 PROCEDURE — P9016 RBC LEUKOCYTES REDUCED: HCPCS

## 2024-01-25 PROCEDURE — 82306 VITAMIN D 25 HYDROXY: CPT

## 2024-01-25 PROCEDURE — 85025 COMPLETE CBC W/AUTO DIFF WBC: CPT

## 2024-01-25 PROCEDURE — 30233N1 TRANSFUSION OF NONAUTOLOGOUS RED BLOOD CELLS INTO PERIPHERAL VEIN, PERCUTANEOUS APPROACH: ICD-10-PCS | Performed by: ORTHOPAEDIC SURGERY

## 2024-01-25 PROCEDURE — 1200000000 HC SEMI PRIVATE

## 2024-01-25 PROCEDURE — 36430 TRANSFUSION BLD/BLD COMPNT: CPT

## 2024-01-25 PROCEDURE — 80053 COMPREHEN METABOLIC PANEL: CPT

## 2024-01-25 PROCEDURE — 85014 HEMATOCRIT: CPT

## 2024-01-25 PROCEDURE — 84484 ASSAY OF TROPONIN QUANT: CPT

## 2024-01-25 PROCEDURE — 97161 PT EVAL LOW COMPLEX 20 MIN: CPT | Performed by: PHYSICAL THERAPIST

## 2024-01-25 PROCEDURE — 97165 OT EVAL LOW COMPLEX 30 MIN: CPT

## 2024-01-25 RX ORDER — SODIUM CHLORIDE 9 MG/ML
INJECTION, SOLUTION INTRAVENOUS PRN
Status: DISCONTINUED | OUTPATIENT
Start: 2024-01-25 | End: 2024-01-28

## 2024-01-25 RX ORDER — FUROSEMIDE 10 MG/ML
20 INJECTION INTRAMUSCULAR; INTRAVENOUS ONCE
Status: COMPLETED | OUTPATIENT
Start: 2024-01-25 | End: 2024-01-25

## 2024-01-25 RX ADMIN — OXYCODONE HYDROCHLORIDE 5 MG: 5 TABLET ORAL at 05:51

## 2024-01-25 RX ADMIN — OXYCODONE HYDROCHLORIDE 10 MG: 5 TABLET ORAL at 10:00

## 2024-01-25 RX ADMIN — CARBOXYMETHYLCELLULOSE SODIUM, GLYCERIN, AND POLYSORBATE 80 1 DROP: 5; 10; 5 SOLUTION/ DROPS OPHTHALMIC at 20:25

## 2024-01-25 RX ADMIN — PANTOPRAZOLE SODIUM 40 MG: 40 TABLET, DELAYED RELEASE ORAL at 08:56

## 2024-01-25 RX ADMIN — GABAPENTIN 100 MG: 100 CAPSULE ORAL at 08:55

## 2024-01-25 RX ADMIN — LEVOTHYROXINE SODIUM 75 MCG: 0.07 TABLET ORAL at 05:51

## 2024-01-25 RX ADMIN — SERTRALINE HYDROCHLORIDE 100 MG: 50 TABLET ORAL at 08:56

## 2024-01-25 RX ADMIN — OXYCODONE HYDROCHLORIDE 10 MG: 5 TABLET ORAL at 15:05

## 2024-01-25 RX ADMIN — FUROSEMIDE 20 MG: 10 INJECTION, SOLUTION INTRAMUSCULAR; INTRAVENOUS at 15:34

## 2024-01-25 RX ADMIN — CHOLESTYRAMINE 4 G: 4 POWDER, FOR SUSPENSION ORAL at 08:55

## 2024-01-25 RX ADMIN — SODIUM CHLORIDE, PRESERVATIVE FREE 10 ML: 5 INJECTION INTRAVENOUS at 20:22

## 2024-01-25 RX ADMIN — CEFAZOLIN 2000 MG: 2 INJECTION, POWDER, FOR SOLUTION INTRAMUSCULAR; INTRAVENOUS at 08:55

## 2024-01-25 RX ADMIN — SODIUM CHLORIDE, PRESERVATIVE FREE 10 ML: 5 INJECTION INTRAVENOUS at 08:58

## 2024-01-25 RX ADMIN — CEFAZOLIN 2000 MG: 2 INJECTION, POWDER, FOR SOLUTION INTRAMUSCULAR; INTRAVENOUS at 00:55

## 2024-01-25 RX ADMIN — GABAPENTIN 200 MG: 100 CAPSULE ORAL at 20:25

## 2024-01-25 RX ADMIN — PANTOPRAZOLE SODIUM 40 MG: 40 TABLET, DELAYED RELEASE ORAL at 20:22

## 2024-01-25 RX ADMIN — CARBOXYMETHYLCELLULOSE SODIUM, GLYCERIN, AND POLYSORBATE 80 1 DROP: 5; 10; 5 SOLUTION/ DROPS OPHTHALMIC at 08:55

## 2024-01-25 ASSESSMENT — PAIN DESCRIPTION - DESCRIPTORS
DESCRIPTORS: ACHING
DESCRIPTORS: DISCOMFORT
DESCRIPTORS: ACHING

## 2024-01-25 ASSESSMENT — PAIN SCALES - GENERAL
PAINLEVEL_OUTOF10: 0
PAINLEVEL_OUTOF10: 10
PAINLEVEL_OUTOF10: 8
PAINLEVEL_OUTOF10: 0
PAINLEVEL_OUTOF10: 6
PAINLEVEL_OUTOF10: 4

## 2024-01-25 ASSESSMENT — PAIN DESCRIPTION - ORIENTATION
ORIENTATION: LEFT
ORIENTATION: LEFT

## 2024-01-25 ASSESSMENT — PAIN DESCRIPTION - LOCATION
LOCATION: GENERALIZED
LOCATION: HIP
LOCATION: LEG

## 2024-01-25 NOTE — PLAN OF CARE
Problem: Discharge Planning  Goal: Discharge to home or other facility with appropriate resources  1/24/2024 2210 by Darrius Iverson RN  Outcome: Progressing  1/24/2024 1223 by Yue Baron RN  Outcome: Progressing     Problem: Pain  Goal: Verbalizes/displays adequate comfort level or baseline comfort level  1/24/2024 2210 by Darrius Iverson RN  Outcome: Progressing  1/24/2024 1223 by Yue Baron RN  Outcome: Progressing     Problem: Skin/Tissue Integrity  Goal: Absence of new skin breakdown  Description: 1.  Monitor for areas of redness and/or skin breakdown  2.  Assess vascular access sites hourly  3.  Every 4-6 hours minimum:  Change oxygen saturation probe site  4.  Every 4-6 hours:  If on nasal continuous positive airway pressure, respiratory therapy assess nares and determine need for appliance change or resting period.  1/24/2024 2210 by Darrius Iverson RN  Outcome: Progressing  1/24/2024 1223 by Yue Baron RN  Outcome: Progressing     Problem: Safety - Adult  Goal: Free from fall injury  1/24/2024 2210 by Darrius Iverson RN  Outcome: Progressing  1/24/2024 1223 by Yue Baron RN  Outcome: Progressing     Problem: ABCDS Injury Assessment  Goal: Absence of physical injury  1/24/2024 2210 by Darrius Iverson RN  Outcome: Progressing  1/24/2024 1223 by Yue Baron RN  Outcome: Progressing

## 2024-01-25 NOTE — PROGRESS NOTES
OCCUPATIONAL THERAPY INITIAL EVALUATION    [facility]  [address] [facility] [place]. [place]        Date:2024                                                  Patient Name: Josie Dominguez    MRN: [id number]    : 1935    Room: [address] [place] [address]      Evaluating OT: Hue Snyder OTR/L; [id number]     Referring Provider and Specific Provider Orders/Date:      24  OT eval and treat  (PT/OT CONSULT PANEL)  ONE TIME   Arie Ybarra, DO            Placement Recommendation: Subacute        Diagnosis:   1. Closed displaced fracture of condyle of left femur, initial encounter ([facility])    2. Other fracture of left femur, initial encounter for closed fracture ([facility])         Procedure:  Open Reduction and Internal Fixation Left Distal femur fracture with retrograde femoral nail     Pertinent Medical History:       Past Medical History:   Diagnosis Date    Acquired hypothyroidism     Arthritis     Blood transfusion     Cancer (HCC)     Uterine    COPD (chronic obstructive pulmonary disease) (HCC)     CVA, old, hemiparesis (HCC)     Depression     GERD (gastroesophageal reflux disease)     Hyperlipidemia     Hypertension     Muscle weakness     Osteoporosis     Other disorders of kidney and ureter     Paroxysmal atrial fibrillation (HCC)     Thyroid disease     Unspecified cerebral artery occlusion with cerebral infarction     At age 45, residual left side weakness         Past Surgical History:   Procedure Laterality Date    ABDOMEN SURGERY      APPENDECTOMY      BACK SURGERY  16    kypho T12 and L3 with bone biopsies    BRAIN SURGERY      COLON SURGERY      COLONOSCOPY      ENDOSCOPY, COLON, DIAGNOSTIC      FIXATION KYPHOPLASTY  2017    L2, L4, L5 with Bone Biopsy & Epidural Injection    TONSILLECTOMY      UPPER GASTROINTESTINAL ENDOSCOPY N/A 2019    EGD BIOPSY performed by Rg Delcid MD at [facility] ENDOSCOPY      Precautions:  Fall Risk, Partial Weight      Prior Level of Function: needs assistance with ADLs and IADLs; wheelchair mobility     Driving: no  Occupation: retired     Pain Level: 7/10 pain in L LE and chronic back pain; Nursing notified.      Cognition: A&O: 2/4; Follows 1 step directions   Memory: fair minus   Sequencing: fair minus   Problem solving: fair minus   Judgement/safety: fair minus     [facility]   AM-PAC Daily Activity - Inpatient   How much help is needed for putting on and taking off regular lower body clothing?: Total  How much help is needed for bathing (which includes washing, rinsing, drying)?: A Lot  How much help is needed for toileting (which includes using toilet, bedpan, or urinal)?: Total  How much help is needed for putting on and taking off regular upper body clothing?: A Lot  How much help is needed for taking care of personal grooming?: A Lot  How much help for eating meals?: A Little  [facility]: [facility]  AM-PAC Inpatient ADL T-Scale Score : 29.04  ADL Inpatient CMS 0-100% Score: 70.42  ADL Inpatient CMS G-Code Modifier : CL     Functional Assessment:    Initial Eval Status  Date: 1/25/24 Treatment Status  Date: STGs = LTGs  Time frame: 10-14 days   Feeding Minimal assist to bring cup to mouth   Independent    Grooming Moderate Assist   Supervision    UB Dressing Maximal Assist   Minimal Assist    LB Dressing Dependent   Minimal Assist    Bathing Maximal Assist  Moderate Assist    Toileting Dependent for hygiene.  Pure wick.   Moderate Assist    Bed Mobility  Supine to sit: Maximal Assist x 2  Sit to supine: Maximal Assist x 2   Rolling:Maximal Assist x 2    Maximal assist x 2 to scoot up in bed.   Pt placed in chair position in  bed, wedge cushions placed on left side to maintain upright position.   Supine to sit: Moderate Assist   Sit to supine: Moderate Assist   Rolling:Moderate Assist     Functional Transfers N/T due to overall debility, decreased activity tolerance, balance deficits, safety and high

## 2024-01-25 NOTE — PROGRESS NOTES
Internal Medicine Progress Note    [facility]    Blane Luz D.O., AZI.                    Edward Apple D.O., ANMOL.                             Kirt Reina D.O.       Iliana Booth, MSN, APRN, NP-C  Bhaskar Bolden, MSN, APRN-CNP  Nigel Torre, MSN, APRN-CNP     Primary Care Physician: Clarence Orosco MD   Admitting Physician:  Blane Luz DO  Admission date and time: 1/23/2024  6:15 PM    Room:  [address] [place] [address]  Admitting diagnosis: Other fracture of left femur, initial encounter for closed fracture (HCC) [S72.8X2A]    Patient Name: Josie Dominguez  MRN: [id number]    Date of Service: 1/25/2024     Subjective:  Josie is a 88 y.o. female who was seen and examined today,1/25/2024, at the bedside.  Patient has surgery yesterday.  Complaining of being hungry but somewhat nauseated.  Generalized weakness noted.  Does complain of fatigue with a drop in hemoglobin noted.  Left foot swollen.  Denies chest pain or shortness of breath    No one present during my examination.    Review of System:   Constitutional:   Denies fever or chills, weight loss or gain.  Generalized weakness and fatigue  HEENT:   Denies ear pain, sore throat, sinus or eye problems.  Cardiovascular:   Denies any chest pain, irregular heartbeats, or palpitations.   Respiratory:   Denies shortness of breath, coughing, sputum production, hemoptysis, or wheezing.  Gastrointestinal:   Complain of slight nausea  Genitourinary:    Denies any urgency, frequency, hematuria. Voiding  without difficulty.  Extremities:   Denies lower extremity swelling, edema or cyanosis.  Pain in left hip area left foot swollen  Neurology:    Denies any headache or focal neurological deficits, Denies generalized weakness or memory difficulty.  Generalized weakness improved history of stroke on left side  Psch:   Denies being anxious or depressed.  Musculoskeletal:    Denies  myalgias, joint complaints or back pain.

## 2024-01-25 NOTE — PROGRESS NOTES
Department of Orthopedic Surgery  Resident Progress Note    Patient seen and examined at bedside this morning.  She notes pain to the operative extremity which is controlled by medications.  She has no concerns of chest pain, shortness of breath, nausea or vomiting.  Her surgery was described to her and questions were addressed at bedside.    VITALS:  BP (!) 100/52   Pulse 86   Temp 98.2 °F (36.8 °C) (Temporal)   Resp 18   Ht 1.753 m (5' 9\")   Wt 54.4 kg (120 lb)   SpO2 96%   BMI 17.72 kg/m²     General: Oriented to person, was not aware that it was morning    MUSCULOSKELETAL:   Left lower extremity:  Ace dressing in place about the extremity, knee immobilizer in appropriate position  Compartments soft and compressible  No dorsiflexion or plantarflexion of the ankle or digits of the foot at baseline  +2/4 DP & PT pulses, Brisk Cap refill, Toes warm and perfused  Sensation intact distally in the foot    CBC:   Lab Results   Component Value Date/Time    WBC 11.3 01/24/2024 05:17 AM    HGB 7.9 01/24/2024 05:17 AM    HCT 24.5 01/24/2024 05:17 AM     01/24/2024 05:17 AM     PT/INR:    Lab Results   Component Value Date/Time    PROTIME 16.0 01/24/2024 05:17 AM    PROTIME 17.6 06/05/2012 02:36 PM    INR 1.4 01/24/2024 05:17 AM         ASSESSMENT  S/P open reduction and retrograde intramedullary nail fixation of left distal femur fracture on 1/24/2024    PLAN      Continue physical therapy and protocol: 50% weightbearing on the left lower extremity for transfers only with knee immobilizer in place, we will transition her to a hinged knee brace  24 hour abx coverage perioperatively  Deep venous thrombosis prophylaxis - at discretion of medicine team while inpatient, she may resume Xarelto, early mobilization  PT/OT as able  Pain Control: IV and PO  Monitor H&H, 7.9 on 1/24, awaiting repeat labs  D/C Plan: Appreciate PT/OT evaluation and treatment.  Appreciate medical management of comorbidities.  We will

## 2024-01-26 LAB
ABO/RH: NORMAL
ALBUMIN SERPL-MCNC: 3 G/DL (ref 3.5–5.2)
ALP SERPL-CCNC: 79 U/L (ref 35–104)
ALT SERPL-CCNC: <5 U/L (ref 0–32)
ANION GAP SERPL CALCULATED.3IONS-SCNC: 10 MMOL/L (ref 7–16)
ANTIBODY IDENTIFICATION: NORMAL
ANTIBODY IDENTIFICATION: NORMAL
ANTIBODY SCREEN: POSITIVE
ANTIGEN TYPE, PATIENT: NORMAL
ARM BAND NUMBER: NORMAL
AST SERPL-CCNC: 18 U/L (ref 0–31)
BASOPHILS # BLD: 0.11 K/UL (ref 0–0.2)
BASOPHILS NFR BLD: 1 % (ref 0–2)
BILIRUB SERPL-MCNC: 0.6 MG/DL (ref 0–1.2)
BLOOD BANK BLOOD PRODUCT EXPIRATION DATE: NORMAL
BLOOD BANK BLOOD PRODUCT EXPIRATION DATE: NORMAL
BLOOD BANK COMMENT: NORMAL
BLOOD BANK DISPENSE STATUS: NORMAL
BLOOD BANK DISPENSE STATUS: NORMAL
BLOOD BANK ISBT PRODUCT BLOOD TYPE: 6200
BLOOD BANK ISBT PRODUCT BLOOD TYPE: 6200
BLOOD BANK PRODUCT CODE: NORMAL
BLOOD BANK PRODUCT CODE: NORMAL
BLOOD BANK SAMPLE EXPIRATION: NORMAL
BLOOD BANK UNIT TYPE AND RH: NORMAL
BLOOD BANK UNIT TYPE AND RH: NORMAL
BPU ID: NORMAL
BPU ID: NORMAL
BUN SERPL-MCNC: 30 MG/DL (ref 6–23)
CALCIUM SERPL-MCNC: 8.3 MG/DL (ref 8.6–10.2)
CHLORIDE SERPL-SCNC: 100 MMOL/L (ref 98–107)
CO2 SERPL-SCNC: 24 MMOL/L (ref 22–29)
COMPONENT: NORMAL
COMPONENT: NORMAL
CREAT SERPL-MCNC: 1.2 MG/DL (ref 0.5–1)
CROSSMATCH RESULT: NORMAL
CROSSMATCH RESULT: NORMAL
DAT, POLYSPECIFIC: NEGATIVE
EOSINOPHIL # BLD: 0 K/UL (ref 0.05–0.5)
EOSINOPHILS RELATIVE PERCENT: 0 % (ref 0–6)
ERYTHROCYTE [DISTWIDTH] IN BLOOD BY AUTOMATED COUNT: 15.4 % (ref 11.5–15)
GFR SERPL CREATININE-BSD FRML MDRD: 44 ML/MIN/1.73M2
GLUCOSE SERPL-MCNC: 129 MG/DL (ref 74–99)
HCT VFR BLD AUTO: 27.1 % (ref 34–48)
HGB BLD-MCNC: 9.1 G/DL (ref 11.5–15.5)
LYMPHOCYTES NFR BLD: 1.02 K/UL (ref 1.5–4)
LYMPHOCYTES RELATIVE PERCENT: 8 % (ref 20–42)
MCH RBC QN AUTO: 31.1 PG (ref 26–35)
MCHC RBC AUTO-ENTMCNC: 33.6 G/DL (ref 32–34.5)
MCV RBC AUTO: 92.5 FL (ref 80–99.9)
MONOCYTES NFR BLD: 0.68 K/UL (ref 0.1–0.95)
MONOCYTES NFR BLD: 5 % (ref 2–12)
NEUTROPHILS NFR BLD: 86 % (ref 43–80)
NEUTS SEG NFR BLD: 11.09 K/UL (ref 1.8–7.3)
PLATELET # BLD AUTO: 180 K/UL (ref 130–450)
PMV BLD AUTO: 10.2 FL (ref 7–12)
POTASSIUM SERPL-SCNC: 4.3 MMOL/L (ref 3.5–5)
PROT SERPL-MCNC: 5.6 G/DL (ref 6.4–8.3)
RBC # BLD AUTO: 2.93 M/UL (ref 3.5–5.5)
RBC # BLD: ABNORMAL 10*6/UL
SODIUM SERPL-SCNC: 134 MMOL/L (ref 132–146)
TRANSFUSION STATUS: NORMAL
TRANSFUSION STATUS: NORMAL
UNIT DIVISION: 0
UNIT DIVISION: 0
UNIT ISSUE DATE/TIME: NORMAL
UNIT ISSUE DATE/TIME: NORMAL
WBC OTHER # BLD: 12.9 K/UL (ref 4.5–11.5)

## 2024-01-26 PROCEDURE — 6370000000 HC RX 637 (ALT 250 FOR IP): Performed by: ORTHOPAEDIC SURGERY

## 2024-01-26 PROCEDURE — 85025 COMPLETE CBC W/AUTO DIFF WBC: CPT

## 2024-01-26 PROCEDURE — 99222 1ST HOSP IP/OBS MODERATE 55: CPT | Performed by: NURSE PRACTITIONER

## 2024-01-26 PROCEDURE — 2580000003 HC RX 258: Performed by: ORTHOPAEDIC SURGERY

## 2024-01-26 PROCEDURE — 36415 COLL VENOUS BLD VENIPUNCTURE: CPT

## 2024-01-26 PROCEDURE — 80053 COMPREHEN METABOLIC PANEL: CPT

## 2024-01-26 PROCEDURE — 1200000000 HC SEMI PRIVATE

## 2024-01-26 RX ADMIN — PANTOPRAZOLE SODIUM 40 MG: 40 TABLET, DELAYED RELEASE ORAL at 08:30

## 2024-01-26 RX ADMIN — OXYCODONE HYDROCHLORIDE 10 MG: 5 TABLET ORAL at 08:35

## 2024-01-26 RX ADMIN — PANTOPRAZOLE SODIUM 40 MG: 40 TABLET, DELAYED RELEASE ORAL at 21:21

## 2024-01-26 RX ADMIN — OXYCODONE HYDROCHLORIDE 10 MG: 5 TABLET ORAL at 12:44

## 2024-01-26 RX ADMIN — OXYCODONE HYDROCHLORIDE 10 MG: 5 TABLET ORAL at 19:28

## 2024-01-26 RX ADMIN — SODIUM CHLORIDE, PRESERVATIVE FREE 10 ML: 5 INJECTION INTRAVENOUS at 21:21

## 2024-01-26 RX ADMIN — CHOLESTYRAMINE 4 G: 4 POWDER, FOR SUSPENSION ORAL at 08:30

## 2024-01-26 RX ADMIN — CARBOXYMETHYLCELLULOSE SODIUM, GLYCERIN, AND POLYSORBATE 80 1 DROP: 5; 10; 5 SOLUTION/ DROPS OPHTHALMIC at 08:30

## 2024-01-26 RX ADMIN — LEVOTHYROXINE SODIUM 75 MCG: 0.07 TABLET ORAL at 08:30

## 2024-01-26 RX ADMIN — GABAPENTIN 100 MG: 100 CAPSULE ORAL at 08:30

## 2024-01-26 RX ADMIN — CARBOXYMETHYLCELLULOSE SODIUM, GLYCERIN, AND POLYSORBATE 80 1 DROP: 5; 10; 5 SOLUTION/ DROPS OPHTHALMIC at 21:30

## 2024-01-26 RX ADMIN — OXYCODONE HYDROCHLORIDE 10 MG: 5 TABLET ORAL at 04:01

## 2024-01-26 RX ADMIN — GABAPENTIN 200 MG: 100 CAPSULE ORAL at 21:21

## 2024-01-26 RX ADMIN — SERTRALINE HYDROCHLORIDE 100 MG: 50 TABLET ORAL at 08:30

## 2024-01-26 ASSESSMENT — PAIN DESCRIPTION - DESCRIPTORS
DESCRIPTORS: ACHING
DESCRIPTORS: ACHING;SORE;TENDER

## 2024-01-26 ASSESSMENT — PAIN DESCRIPTION - LOCATION
LOCATION: SHOULDER;LEG
LOCATION: LEG

## 2024-01-26 ASSESSMENT — PAIN SCALES - GENERAL
PAINLEVEL_OUTOF10: 7
PAINLEVEL_OUTOF10: 9
PAINLEVEL_OUTOF10: 8

## 2024-01-26 ASSESSMENT — PAIN DESCRIPTION - ORIENTATION
ORIENTATION: LEFT
ORIENTATION: LEFT

## 2024-01-26 NOTE — CARE COORDINATION
1/26/2024: SS NOTE:  Notified by Jun[facility] admissions for [facility] that [facility] is still PENDING for skilled return, ANGELICA initiated, sw/cm will follow for insurance authorization and medical discharge to confirm transfer,  sw will follow for insurance authorization to confirm transfer arrangements, Nursing informed.Electronically signed by GERALDINE Ford on 1/26/2024 at 12:26 PM

## 2024-01-26 NOTE — CARE COORDINATION
SS Note: Pt's 2nd IMM letter given to pt. Also placed copy in chart.  Electronically signed by GERALDINE Bain on 1/26/2024 at 11:52 AM     Yes

## 2024-01-26 NOTE — PROGRESS NOTES
Internal Medicine Progress Note    [facility]    Blane Luz D.O., ANMOL.                    Edward Apple D.O., ANMOL.                             Kirt Reina D.O.       Iliana Booth, MSN, APRN, NP-C  Bhaskar Bolden, MSN, APRN-CNP  Nigel Torre, MSN, APRN-CNP     Primary Care Physician: Clarence Orosco MD   Admitting Physician:  Blane Luz DO  Admission date and time: 1/23/2024  6:15 PM    Room:  [address] [place] [address]  Admitting diagnosis: Other fracture of left femur, initial encounter for closed fracture (HCC) [S72.8X2A]    Patient Name: Josie Dominguez  MRN: [id number]    Date of Service: 1/26/2024     Subjective:  Josie is a 88 y.o. female who was seen and examined today,1/26/2024, at the bedside.  Patient hemoglobin has improved since transfusion.  Patient states that she is somewhat hungry but does have poor appetite.  Patient is working with physical therapy.  She denies chest pain or shortness of breath discharge planning is being anticipated    No one present during my examination.    Review of System:   Constitutional:   Denies fever or chills, weight loss or gain.  Generalized weakness and fatigue  HEENT:   Denies ear pain, sore throat, sinus or eye problems.  Cardiovascular:   Denies any chest pain, irregular heartbeats, or palpitations.   Respiratory:   Denies shortness of breath, coughing, sputum production, hemoptysis, or wheezing.  Gastrointestinal:   Complain of slight nausea--improved with slight appetite improved  Genitourinary:    Denies any urgency, frequency, hematuria. Voiding  without difficulty.  Extremities:   Denies lower extremity swelling, edema or cyanosis.  Pain in left hip area left foot swollen  Neurology:    Denies any headache or focal neurological deficits, Denies generalized weakness or memory difficulty.  Generalized weakness improved history of stroke on left side  Psch:   Denies being anxious or depressed.  Musculoskeletal:

## 2024-01-26 NOTE — PROGRESS NOTES
Physical Therapy  Physical Therapy    Room #:   0330/0330-02    Date: 2024       Patient Name: Josie Dominguez  : 1935      MRN: [id number]     Patient unavailable for physical therapy treatment due to  patient was sleeping. Asked family if she wanted me to wake her up or let her sleep, she stated to let her sleep . Will attempt PT treatment tomorrow. Thank you.      Hugh Cortes  [facility]  LIC # [id number]

## 2024-01-26 NOTE — PROGRESS NOTES
Department of Orthopedic Surgery  Resident Progress Note    Patient seen and examined at bedside this morning.  Her primary complaint today is neck pain felt to be related to positioning.  No changes in sensation to the extremity.  She denies chest pain or shortness of breath.  She denies nausea or vomiting.    VITALS:  BP (!) 95/41   Pulse 76   Temp 98.4 °F (36.9 °C) (Oral)   Resp 18   Ht 1.753 m (5' 9\")   Wt 54.4 kg (120 lb)   SpO2 100%   BMI 17.72 kg/m²     General: Oriented to person, tearful    MUSCULOSKELETAL:   Left lower extremity:  Ace dressing in place about the extremity, hinged knee brace in place  Compartments soft and compressible  No dorsiflexion or plantarflexion of the ankle or digits of the foot at baseline  +2/4 DP & PT pulses, Brisk Cap refill, Toes warm and perfused  Sensation intact distally in the foot    CBC:   Lab Results   Component Value Date/Time    WBC 12.9 01/26/2024 04:10 AM    HGB 9.1 01/26/2024 04:10 AM    HCT 27.1 01/26/2024 04:10 AM     01/26/2024 04:10 AM     PT/INR:    Lab Results   Component Value Date/Time    PROTIME 16.0 01/24/2024 05:17 AM    PROTIME 17.6 06/05/2012 02:36 PM    INR 1.4 01/24/2024 05:17 AM         ASSESSMENT  S/P open reduction and retrograde intramedullary nail fixation of left distal femur fracture on 1/24/2024    PLAN      Continue physical therapy and protocol: 50% weightbearing on the left lower extremity for transfers only with hinged knee brace in place, 0-30  24 hour abx coverage perioperatively, completed  Deep venous thrombosis prophylaxis - at discretion of medicine team while inpatient, she may resume Xarelto, early mobilization  PT/OT as able  Pain Control: IV and PO  Monitor H&H, 9.1 on 1/26/2024  D/C Plan: Appreciate PT/OT evaluation and treatment.  We will follow peripherally at this time.      ATTENDING:  Agree with above.  Continue as above.    Arthur Little MD

## 2024-01-26 NOTE — CONSULTS
Palliative Care Department  [phone]  Palliative Care Initial Consult  Provider MERT Hernandez CNP     Josie Dominguez  [id number]  Hospital Day: 4  Date of Initial Consult: 1/26/2024  Referring Provider: Nigel Torre APRN - CNP  Palliative Medicine was consulted for assistance with: Goals of care, overwhelming symptoms    HPI:   Josie Dominguez is a 88 y.o. with a medical history of thyroid disease, arthritis, uterine cancer, COPD, history of CVA, GERD, hyperlipidemia, hypertension, atrial fibrillation, who was admitted on 1/23/2024 from nursing facility with a CHIEF COMPLAINT of left hip pain.  Patient was being transferred from her wheelchair to her bed when she heard a crack and had immediate left hip pain. X-ray left knee/femur demonstrates acute supracondylar femur fracture that appears to be shortened and in procurvatum.  Difficult to assess if fracture extends intra-articularly. CT left femur/knee demonstrates fracture noted above.  There does appear to be some comminution about the fracture site.  The fracture does appear to extend anteriorly to the articular surface.  Patient had ORIF of left distal femur on 1/24. Palliative medicine consult for further assistance.     ASSESSMENT/PLAN:     Pertinent Hospital Diagnoses     Displaced fracture of the condyle of the left femur s/p ORIF left distal femur  Deformity of the left pubic rami  Acute on chronic anemia  COPD  Atrial fibrillation  History of uterine cancer  COPD  History of CVA      Palliative Care Encounter / Counseling Regarding Goals of Care  Please see detailed goals of care discussion as below  At this time, Josie Dominguez, Does Not have capacity for medical decision-making.  Capacity is time limited and situation/question specific  During encounter Seble Pichardo were surrogate medical decision-maker  Outcome of goals of care meeting:   Continue conservative medical management  No heroic measures, No blood products  Hospice

## 2024-01-27 LAB
ALBUMIN SERPL-MCNC: 2.7 G/DL (ref 3.5–5.2)
ALP SERPL-CCNC: 91 U/L (ref 35–104)
ALT SERPL-CCNC: <5 U/L (ref 0–32)
ANION GAP SERPL CALCULATED.3IONS-SCNC: 10 MMOL/L (ref 7–16)
AST SERPL-CCNC: 23 U/L (ref 0–31)
BASOPHILS # BLD: 0.02 K/UL (ref 0–0.2)
BASOPHILS NFR BLD: 0 % (ref 0–2)
BILIRUB SERPL-MCNC: 0.7 MG/DL (ref 0–1.2)
BUN SERPL-MCNC: 21 MG/DL (ref 6–23)
CALCIUM SERPL-MCNC: 8.2 MG/DL (ref 8.6–10.2)
CHLORIDE SERPL-SCNC: 101 MMOL/L (ref 98–107)
CO2 SERPL-SCNC: 22 MMOL/L (ref 22–29)
CREAT SERPL-MCNC: 0.9 MG/DL (ref 0.5–1)
EOSINOPHIL # BLD: 0.04 K/UL (ref 0.05–0.5)
EOSINOPHILS RELATIVE PERCENT: 1 % (ref 0–6)
ERYTHROCYTE [DISTWIDTH] IN BLOOD BY AUTOMATED COUNT: 14.6 % (ref 11.5–15)
GFR SERPL CREATININE-BSD FRML MDRD: >60 ML/MIN/1.73M2
GLUCOSE SERPL-MCNC: 103 MG/DL (ref 74–99)
HCT VFR BLD AUTO: 23.7 % (ref 34–48)
HGB BLD-MCNC: 8.1 G/DL (ref 11.5–15.5)
IMM GRANULOCYTES # BLD AUTO: 0.05 K/UL (ref 0–0.58)
IMM GRANULOCYTES NFR BLD: 1 % (ref 0–5)
LYMPHOCYTES NFR BLD: 0.7 K/UL (ref 1.5–4)
LYMPHOCYTES RELATIVE PERCENT: 9 % (ref 20–42)
MCH RBC QN AUTO: 32.5 PG (ref 26–35)
MCHC RBC AUTO-ENTMCNC: 34.2 G/DL (ref 32–34.5)
MCV RBC AUTO: 95.2 FL (ref 80–99.9)
MONOCYTES NFR BLD: 0.86 K/UL (ref 0.1–0.95)
MONOCYTES NFR BLD: 11 % (ref 2–12)
NEUTROPHILS NFR BLD: 80 % (ref 43–80)
NEUTS SEG NFR BLD: 6.43 K/UL (ref 1.8–7.3)
PLATELET # BLD AUTO: 187 K/UL (ref 130–450)
PMV BLD AUTO: 10.1 FL (ref 7–12)
POTASSIUM SERPL-SCNC: 3.8 MMOL/L (ref 3.5–5)
PROT SERPL-MCNC: 5.3 G/DL (ref 6.4–8.3)
RBC # BLD AUTO: 2.49 M/UL (ref 3.5–5.5)
SODIUM SERPL-SCNC: 133 MMOL/L (ref 132–146)
WBC OTHER # BLD: 8.1 K/UL (ref 4.5–11.5)

## 2024-01-27 PROCEDURE — 36415 COLL VENOUS BLD VENIPUNCTURE: CPT

## 2024-01-27 PROCEDURE — 6370000000 HC RX 637 (ALT 250 FOR IP): Performed by: ORTHOPAEDIC SURGERY

## 2024-01-27 PROCEDURE — 85025 COMPLETE CBC W/AUTO DIFF WBC: CPT

## 2024-01-27 PROCEDURE — 6360000002 HC RX W HCPCS

## 2024-01-27 PROCEDURE — 6370000000 HC RX 637 (ALT 250 FOR IP)

## 2024-01-27 PROCEDURE — 80053 COMPREHEN METABOLIC PANEL: CPT

## 2024-01-27 PROCEDURE — 2580000003 HC RX 258: Performed by: ORTHOPAEDIC SURGERY

## 2024-01-27 PROCEDURE — 1200000000 HC SEMI PRIVATE

## 2024-01-27 RX ORDER — HYDROMORPHONE HYDROCHLORIDE 1 MG/ML
1 INJECTION, SOLUTION INTRAMUSCULAR; INTRAVENOUS; SUBCUTANEOUS
Status: DISCONTINUED | OUTPATIENT
Start: 2024-01-27 | End: 2024-01-28

## 2024-01-27 RX ORDER — MORPHINE SULFATE 10 MG/.5ML
10 SOLUTION ORAL
Status: DISCONTINUED | OUTPATIENT
Start: 2024-01-27 | End: 2024-01-27

## 2024-01-27 RX ADMIN — HYDROMORPHONE HYDROCHLORIDE 1 MG: 1 INJECTION, SOLUTION INTRAMUSCULAR; INTRAVENOUS; SUBCUTANEOUS at 20:04

## 2024-01-27 RX ADMIN — Medication 10 MG: at 10:47

## 2024-01-27 RX ADMIN — SODIUM CHLORIDE, PRESERVATIVE FREE 10 ML: 5 INJECTION INTRAVENOUS at 20:18

## 2024-01-27 RX ADMIN — CARBOXYMETHYLCELLULOSE SODIUM, GLYCERIN, AND POLYSORBATE 80 1 DROP: 5; 10; 5 SOLUTION/ DROPS OPHTHALMIC at 08:23

## 2024-01-27 RX ADMIN — PANTOPRAZOLE SODIUM 40 MG: 40 TABLET, DELAYED RELEASE ORAL at 08:23

## 2024-01-27 RX ADMIN — HYDROMORPHONE HYDROCHLORIDE 1 MG: 1 INJECTION, SOLUTION INTRAMUSCULAR; INTRAVENOUS; SUBCUTANEOUS at 16:31

## 2024-01-27 RX ADMIN — LEVOTHYROXINE SODIUM 75 MCG: 0.07 TABLET ORAL at 05:31

## 2024-01-27 RX ADMIN — Medication 10 MG: at 13:00

## 2024-01-27 RX ADMIN — OXYCODONE HYDROCHLORIDE 10 MG: 5 TABLET ORAL at 06:52

## 2024-01-27 RX ADMIN — Medication 10 MG: at 15:18

## 2024-01-27 RX ADMIN — OXYCODONE HYDROCHLORIDE 10 MG: 5 TABLET ORAL at 02:45

## 2024-01-27 ASSESSMENT — PAIN DESCRIPTION - LOCATION
LOCATION: OTHER (COMMENT)
LOCATION: GENERALIZED
LOCATION: NECK;BACK;LEG
LOCATION: GENERALIZED
LOCATION: GENERALIZED;LEG

## 2024-01-27 ASSESSMENT — PAIN DESCRIPTION - DESCRIPTORS
DESCRIPTORS: ACHING
DESCRIPTORS: ACHING
DESCRIPTORS: OTHER (COMMENT)
DESCRIPTORS: ACHING
DESCRIPTORS: ACHING

## 2024-01-27 ASSESSMENT — PAIN SCALES - GENERAL
PAINLEVEL_OUTOF10: 8
PAINLEVEL_OUTOF10: 4
PAINLEVEL_OUTOF10: 10
PAINLEVEL_OUTOF10: 9
PAINLEVEL_OUTOF10: 8

## 2024-01-27 ASSESSMENT — PAIN DESCRIPTION - ORIENTATION
ORIENTATION: OTHER (COMMENT)
ORIENTATION: LEFT

## 2024-01-27 ASSESSMENT — PAIN - FUNCTIONAL ASSESSMENT: PAIN_FUNCTIONAL_ASSESSMENT: PREVENTS OR INTERFERES WITH ALL ACTIVE AND SOME PASSIVE ACTIVITIES

## 2024-01-27 NOTE — PROGRESS NOTES
Physical Therapy  Physical Therapy    Room #:   0330/0330-02    Date: 2024       Patient Name: Josie Dominguez  : 1935      MRN: [id number]     Patient unavailable for physical therapy treatment due to  per family all therapy is cancelled .    Thank you.      Luz Corrigan, PTA

## 2024-01-27 NOTE — PLAN OF CARE
Problem: Discharge Planning  Goal: Discharge to home or other facility with appropriate resources  1/26/2024 2206 by Awa Sargent RN  Outcome: Progressing     Problem: Pain  Goal: Verbalizes/displays adequate comfort level or baseline comfort level  1/26/2024 2206 by Awa Sargent RN  Outcome: Progressing     Problem: Skin/Tissue Integrity  Goal: Absence of new skin breakdown  Description: 1.  Monitor for areas of redness and/or skin breakdown  2.  Assess vascular access sites hourly  3.  Every 4-6 hours minimum:  Change oxygen saturation probe site  4.  Every 4-6 hours:  If on nasal continuous positive airway pressure, respiratory therapy assess nares and determine need for appliance change or resting period.  1/26/2024 2206 by Awa Sargent RN  Outcome: Progressing     Problem: Safety - Adult  Goal: Free from fall injury  1/26/2024 2206 by Awa Sargent RN  Outcome: Progressing     Problem: ABCDS Injury Assessment  Goal: Absence of physical injury  1/26/2024 2206 by Awa Sargent RN  Outcome: Progressing     Problem: Chronic Conditions and Co-morbidities  Goal: Patient's chronic conditions and co-morbidity symptoms are monitored and maintained or improved  1/26/2024 2206 by Awa Sargent RN  Outcome: Progressing

## 2024-01-27 NOTE — PROGRESS NOTES
Consult received and chart reviewed. Call placed to daughter Seble. No answer left vm.     1149 am received return call from Seble. Explained liaison is at the [facility] seeing pt's but will be traveling to Health system this afternoon would she be at bedside. Seble and sisters will be at bedside. Hospice liaison to see this afternoon.     Electronically signed by Aura Monroy RN on 1/27/2024 at 12:32 PM  [phone]

## 2024-01-27 NOTE — PROGRESS NOTES
Notified by the patient shares that the patient is unable to transition to hospice without IV pain medications.  Will stop the morphine elixir and start Dilaudid every 2 hours as needed for the patient's comfort.  The family is also waiting for another family member to arrive on Monday.

## 2024-01-27 NOTE — CARE COORDINATION
1/27/2024: SS NOTE:  Notified by nursing of Hospice consult for more information, sw met with pt's 3 daughters at their mother's bedside, pt currently sleeping, pt is a LTC pt from [facility] but were attempting to accept her back skilled for rehab however family report not wanting their mother now to return to [facility], do not feel she can tolerate rehab, are wanting her to remain at the hospital and to be made comfortable, Hospice provider list and options and possible Hospice House transfer discussed, family request to speak with [facility] liaison for information only at this time, referral made to [facility] answering service, will contact on call liaison to meet with pt's family, Nursing notified.Electronically signed by GERALDINE Ford on 1/27/2024 at 10:32 AM

## 2024-01-27 NOTE — PROGRESS NOTES
Visit made to the bedside. Met with 2 of the 3 daughters. Pt is alert and able to have meaningful conversation. Pt had just been medicated 30 mins prior to arrival of HOTV liaison and was lethargic but able to answer questions. Pt states pain level was 10 out of 10. Hospice philosophy and services discussed. All questions answered. Family has asked to keep pt in the hospital till Monday as the pt's son is traveling in from [place] tomorrow late afternoon and they dont want to move her prior to him arriving. [facility] liaison did ask Bedside nurse to reach out to Dr to get comfort medications converted to IV. Pt does not currently meet criteria for the hh as she has not received IV comfort medications. Pt to be evaluated on Monday for appropriate level of care. Updated CM.    Electronically signed by Aura Monroy RN on 1/27/2024 at 2:44 PM  [phone]

## 2024-01-27 NOTE — PROGRESS NOTES
left hip area left foot swollen  Neurology:    Denies any headache or focal neurological deficits, Denies generalized weakness or memory difficulty.  Generalized weakness improved history of stroke on left side  Psch:   Denies being anxious or depressed.  Musculoskeletal:    Denies  myalgias, joint complaints or back pain.   Integumentary:   Denies any rashes, ulcers, or excoriations.  Denies bruising.  Hematologic/Lymphatic:  Denies bruising or bleeding.    Physical Exam:  I/O this shift:  In: 0   Out: 250 [Urine:250]    Intake/Output Summary (Last 24 hours) at 1/27/2024 1445  Last data filed at 1/27/2024 1357  Gross per 24 hour   Intake 0 ml   Output 450 ml   Net -450 ml   I/O last 3 completed shifts:  In: 340 [P.O.:340]  Out: 800 [Urine:800]  Patient Vitals for the past 96 hrs (Last 3 readings):   Weight   01/23/24 2000 54.4 kg (120 lb)     Vital Signs:   Blood pressure (!) 100/42, pulse 84, temperature 99.9 °F (37.7 °C), temperature source Oral, resp. rate 15, height 1.753 m (5' 9\"), weight 54.4 kg (120 lb), SpO2 95 %, not currently breastfeeding.    General appearance:  Alert, responsive, oriented to person, place, and time. Well preserved, alert, no distress.  Head:  Normocephalic. No masses, lesions or tenderness.  Eyes:  PERRLA.  EOMI.  Sclera clear.  Buccal mucosa moist.  ENT:  Ears normal. Mucosa normal.  Neck:    Supple. Trachea midline. No thyromegaly. No JVD. No bruits.  Heart:    Rhythm regular. Rate controlled.  1/6 murmurs.  Lungs:    Symmetrical. Clear to auscultation bilaterally.  No wheezes. No rhonchi. No rales.  Abdomen:   Soft. Non-tender. Non-distended. Bowel sounds positive. No organomegaly or masses.  No pain on palpation.  Extremities:    Left foot swollen--improved.  Weakness on left side  Neurologic:    Alert x 3.  No focal deficit.  Cranial nerves grossly intact. No focal weakness.  Psych:   Behavior is normal. Mood appears normal. Speech is not rapid and/or pressured.  Musculoskeletal:  Dr. Patricio Rodriguez on January 24  Deformity of the left pubic rami likely old injury  Acute on chronic anemia with hemorrhagic component  Paroxysmal atrial fibrillation  Primary hypothyroidism  History of uterine cancer  COPD  History of CVA with residual left-sided weakness  Osteoporosis  Hyperlipidemia  Essential hypertension  Gastro esophageal reflux disease  Depression  Probable urinary tract infection  Postsurgical anemia with transfusion            Plan:     Family have requested palliative care consult and currently needs towards hospice  Wishes no further blood transfusion  Does not want any further therapy  Has requested p.o. pain medication  Will discontinue lab.  Awaiting hospice evaluation and palliative care decision      More than 50% of my  time was spent at the bedside counseling/coordinating care with the patient and/or family with face to face contact.  This time was spent reviewing notes and laboratory data as well as instructing and counseling the patient. Time I spent with the family or surrogate(s) is included only if the patient was incapable of providing the necessary information or participating in medical decisions. I also discussed the differential diagnosis and all of the proposed management plans with the patient and individuals accompanying the patient.    Blane Luz DO, F.A.C.O.I.  1/27/2024  2:45 PM

## 2024-01-28 PROBLEM — Z51.5 HOSPICE CARE: Status: ACTIVE | Noted: 2024-01-28

## 2024-01-28 PROCEDURE — 6360000002 HC RX W HCPCS

## 2024-01-28 PROCEDURE — 2580000003 HC RX 258

## 2024-01-28 PROCEDURE — 1200000000 HC SEMI PRIVATE

## 2024-01-28 RX ORDER — GLYCOPYRROLATE 0.2 MG/ML
0.2 INJECTION INTRAMUSCULAR; INTRAVENOUS EVERY 4 HOURS PRN
Status: DISCONTINUED | OUTPATIENT
Start: 2024-01-28 | End: 2024-02-03 | Stop reason: HOSPADM

## 2024-01-28 RX ORDER — LORAZEPAM 2 MG/ML
1 INJECTION INTRAMUSCULAR EVERY 4 HOURS
Status: DISCONTINUED | OUTPATIENT
Start: 2024-01-28 | End: 2024-02-03 | Stop reason: HOSPADM

## 2024-01-28 RX ADMIN — GLYCOPYRROLATE 0.2 MG: 0.2 INJECTION INTRAMUSCULAR; INTRAVENOUS at 23:51

## 2024-01-28 RX ADMIN — GLYCOPYRROLATE 0.2 MG: 0.2 INJECTION INTRAMUSCULAR; INTRAVENOUS at 19:28

## 2024-01-28 RX ADMIN — LORAZEPAM 1 MG: 2 INJECTION INTRAMUSCULAR; INTRAVENOUS at 11:20

## 2024-01-28 RX ADMIN — LORAZEPAM 1 MG: 2 INJECTION INTRAMUSCULAR; INTRAVENOUS at 19:28

## 2024-01-28 RX ADMIN — LORAZEPAM 1 MG: 2 INJECTION INTRAMUSCULAR; INTRAVENOUS at 16:02

## 2024-01-28 RX ADMIN — GLYCOPYRROLATE 0.2 MG: 0.2 INJECTION INTRAMUSCULAR; INTRAVENOUS at 13:27

## 2024-01-28 RX ADMIN — MORPHINE SULFATE 2 MG/HR: 10 INJECTION, SOLUTION INTRAMUSCULAR; INTRAVENOUS at 08:07

## 2024-01-28 RX ADMIN — HYDROMORPHONE HYDROCHLORIDE 1 MG: 1 INJECTION, SOLUTION INTRAMUSCULAR; INTRAVENOUS; SUBCUTANEOUS at 01:51

## 2024-01-28 RX ADMIN — LORAZEPAM 1 MG: 2 INJECTION INTRAMUSCULAR; INTRAVENOUS at 23:51

## 2024-01-28 RX ADMIN — HYDROMORPHONE HYDROCHLORIDE 1 MG: 1 INJECTION, SOLUTION INTRAMUSCULAR; INTRAVENOUS; SUBCUTANEOUS at 05:30

## 2024-01-28 NOTE — PROGRESS NOTES
Physical Therapy      Physical Therapy    Room #:   0330/0330-02    Date: 2024       Patient Name: Josie Dominguez  : 1935      MRN: [id number]     Patient unavailable for physical therapy treatment due to unavailable/not appropriate per nursing due to patient now on hospice and family requests to no longer have therapy . Please re-order if therapy is needed. Thank you.      Awa Coronado, PTA  #[id number]

## 2024-01-28 NOTE — PROGRESS NOTES
OT SESSION HOLD     Date:2024  Patient Name: Josie Dominguez  MRN: [id number]  : 1935  Room: [address] [place] [address]-     Family wishes that mother will be treated conservatively as requested, palliative care consult with hospice.  They do not want to have any more therapy. Re-order if requested.      Sweetie Santiago OTR/L; [id number]

## 2024-01-28 NOTE — PROGRESS NOTES
Internal Medicine Progress Note    [facility]    Blane Luz D.O., ANMOL.                    Edward Apple D.O., ANMOL.                             Kirt Reina D.O.       Iliana Booth, MSN, APRN, NP-C  Bhaskar Bolden, MSN, APRN-CNP  Nigel Torre, MSN, APRN-CNP     Primary Care Physician: Clarence Orosco MD   Admitting Physician:  Blane Luz DO  Admission date and time: 1/23/2024  6:15 PM    Room:  [address] [place] [address]  Admitting diagnosis: Other fracture of left femur, initial encounter for closed fracture (HCC) [S72.8X2A]    Patient Name: Josie Dominguez  MRN: [id number]    Date of Service: 1/28/2024     Subjective:  Josie is a 88 y.o. female who was seen and examined today,1/28/2024, at the bedside.  Multiple family members at bedside including 2 daughters and a sister.  They have requested hospice care and did inquire about morphine for pain.  They did request this be switched so to IV medications he has received this last evening but has not received this sometime in timely fashion.  They have requested IV infusion at this time.  They realize that the mother is very frustrated with the ongoing decline in her condition and ongoing pain from recent fracture  .  2 daughters and sister present at the bedside during my examination.    Review of System: Limited at this time  Constitutional:   Denies fever or chills, weight loss or gain.  Generalized weakness and fatigue  HEENT:   Denies ear pain, sore throat, sinus or eye problems.  Cardiovascular:   Denies any chest pain, irregular heartbeats, or palpitations.   Respiratory:   Denies shortness of breath, coughing, sputum production, hemoptysis, or wheezing.  Gastrointestinal:   Complain of slight nausea--improved with slight appetite improved  Genitourinary:    Denies any urgency, frequency, hematuria. Voiding  without difficulty.  Extremities:   Denies lower extremity swelling, edema or cyanosis.  Pain in left hip area

## 2024-01-29 PROCEDURE — 99231 SBSQ HOSP IP/OBS SF/LOW 25: CPT | Performed by: NURSE PRACTITIONER

## 2024-01-29 PROCEDURE — 2580000003 HC RX 258

## 2024-01-29 PROCEDURE — 6360000002 HC RX W HCPCS

## 2024-01-29 PROCEDURE — 1200000000 HC SEMI PRIVATE

## 2024-01-29 RX ORDER — SCOLOPAMINE TRANSDERMAL SYSTEM 1 MG/1
1 PATCH, EXTENDED RELEASE TRANSDERMAL
Status: DISCONTINUED | OUTPATIENT
Start: 2024-01-29 | End: 2024-02-03 | Stop reason: HOSPADM

## 2024-01-29 RX ADMIN — LORAZEPAM 1 MG: 2 INJECTION INTRAMUSCULAR; INTRAVENOUS at 08:06

## 2024-01-29 RX ADMIN — GLYCOPYRROLATE 0.2 MG: 0.2 INJECTION INTRAMUSCULAR; INTRAVENOUS at 05:54

## 2024-01-29 RX ADMIN — LORAZEPAM 1 MG: 2 INJECTION INTRAMUSCULAR; INTRAVENOUS at 15:54

## 2024-01-29 RX ADMIN — LORAZEPAM 1 MG: 2 INJECTION INTRAMUSCULAR; INTRAVENOUS at 11:55

## 2024-01-29 RX ADMIN — MORPHINE SULFATE 4 MG/HR: 10 INJECTION, SOLUTION INTRAMUSCULAR; INTRAVENOUS at 05:56

## 2024-01-29 RX ADMIN — LORAZEPAM 1 MG: 2 INJECTION INTRAMUSCULAR; INTRAVENOUS at 19:22

## 2024-01-29 RX ADMIN — LORAZEPAM 1 MG: 2 INJECTION INTRAMUSCULAR; INTRAVENOUS at 03:40

## 2024-01-29 RX ADMIN — LORAZEPAM 1 MG: 2 INJECTION INTRAMUSCULAR; INTRAVENOUS at 23:59

## 2024-01-29 NOTE — PROGRESS NOTES
[facility]    Met patient and family at bedside. Patient resting quietly. All Hospice questions answered. Patient's family declines Hospice options to tranfer patient to ECF or HH if approved for GIP level of care today. Family is agreeable to [facility] follow tomorrow.     Electronically signed by Eleanor Tillman RN on 1/29/2024 at 2:56 PM  [phone]: [phone]

## 2024-01-29 NOTE — PROGRESS NOTES
Pt pulse ox checked per family request, room air 80%, family requests that no oxygen be applied to pt.

## 2024-01-29 NOTE — PROGRESS NOTES
Pts family declines for pt to be repositioned in bed, advised that if they want her repositioned to please just let staff know.

## 2024-01-29 NOTE — PROGRESS NOTES
Internal Medicine Progress Note    [facility]    Blane Luz D.O., ANMOL.                    Edward Apple D.O., TIMUR Riena D.O.       Iliana Booth, MSN, APRN, NP-C  Bhaskar Bolden, MSN, APRN-CNP  Nigel Torre, MSN, APRN-CNP     Primary Care Physician: Clarence Orosco MD   Admitting Physician:  Blane Luz DO  Admission date and time: 1/23/2024  6:15 PM    Room:  [address] [place] [address]  Admitting diagnosis: Other fracture of left femur, initial encounter for closed fracture (HCC) [S72.8X2A]    Patient Name: Josie Dominguez  MRN: [id number]    Date of Service: 1/29/2024     Subjective:  Josie is a 88 y.o. female who was seen and examined today,1/29/2024, at the bedside.  She appears to be very comfortable on morphine infusion.  We have reviewed the recent events and plan of care with the patient's daughter who is present at the bedside during my examination.  We have discussed potential hospice house versus inpatient hospice.  No additional complaints or concerns.    Review of systems: Unable to be obtained in the patient's current condition.    Physical Exam:  No intake/output data recorded.    Intake/Output Summary (Last 24 hours) at 1/29/2024 0726  Last data filed at 1/29/2024 0518  Gross per 24 hour   Intake 0 ml   Output 150 ml   Net -150 ml     I/O last 3 completed shifts:  In: 0   Out: 150 [Urine:150]  No data found.    Vital Signs:   Blood pressure (!) 100/51, pulse (!) 113, temperature (!) 101.1 °F (38.4 °C), temperature source Axillary, resp. rate 10, height 1.753 m (5' 9\"), weight 54.4 kg (120 lb), SpO2 (!) 86 %, not currently breastfeeding.    General appearance:  Resting comfortably  Head:  Normocephalic. No masses, lesions or tenderness.  Eyes:  PERRLA.  EOMI.  Sclera clear.  Buccal mucosa moist.  ENT:  Ears normal. Mucosa normal.  Neck:    Supple. Trachea midline. No thyromegaly. No JVD. No bruits.  Heart:    Rhythm

## 2024-01-29 NOTE — PROGRESS NOTES
Palliative Care Department  [phone]  Palliative Care Progress Note  Provider MERT Hernandez CNP     Josie Dominguez  [id number]  Hospital Day: 7  Date of Initial Consult: 1/26/2024  Referring Provider: Nigel Torre APRN - CNP  Palliative Medicine was consulted for assistance with: Goals of care, overwhelming symptoms    HPI:   Josie Dominguez is a 88 y.o. with a medical history of thyroid disease, arthritis, uterine cancer, COPD, history of CVA, GERD, hyperlipidemia, hypertension, atrial fibrillation, who was admitted on 1/23/2024 from nursing facility with a CHIEF COMPLAINT of left hip pain.  Patient was being transferred from her wheelchair to her bed when she heard a crack and had immediate left hip pain. X-ray left knee/femur demonstrates acute supracondylar femur fracture that appears to be shortened and in procurvatum.  Difficult to assess if fracture extends intra-articularly. CT left femur/knee demonstrates fracture noted above.  There does appear to be some comminution about the fracture site.  The fracture does appear to extend anteriorly to the articular surface.  Patient had ORIF of left distal femur on 1/24. Palliative medicine consult for further assistance.     ASSESSMENT/PLAN:     Pertinent Hospital Diagnoses     Displaced fracture of the condyle of the left femur s/p ORIF left distal femur  Deformity of the left pubic rami  Acute on chronic anemia  COPD  Atrial fibrillation  History of uterine cancer  COPD  History of CVA      Palliative Care Encounter / Counseling Regarding Goals of Care  Please see detailed goals of care discussion as below  At this time, Josie Dominguez, Does Not have capacity for medical decision-making.  Capacity is time limited and situation/question specific  During encounter Seble Pichardo were surrogate medical decision-maker  Outcome of goals of care meeting:   Comfort measures  Code status DNR-CC  Advanced Directives: POA or living will in

## 2024-01-30 PROCEDURE — 1200000000 HC SEMI PRIVATE

## 2024-01-30 PROCEDURE — 99231 SBSQ HOSP IP/OBS SF/LOW 25: CPT | Performed by: NURSE PRACTITIONER

## 2024-01-30 PROCEDURE — 6360000002 HC RX W HCPCS

## 2024-01-30 PROCEDURE — 2580000003 HC RX 258

## 2024-01-30 RX ADMIN — MORPHINE SULFATE 4 MG/HR: 10 INJECTION, SOLUTION INTRAMUSCULAR; INTRAVENOUS at 08:39

## 2024-01-30 RX ADMIN — GLYCOPYRROLATE 0.2 MG: 0.2 INJECTION INTRAMUSCULAR; INTRAVENOUS at 16:40

## 2024-01-30 RX ADMIN — LORAZEPAM 1 MG: 2 INJECTION INTRAMUSCULAR; INTRAVENOUS at 16:35

## 2024-01-30 RX ADMIN — LORAZEPAM 1 MG: 2 INJECTION INTRAMUSCULAR; INTRAVENOUS at 03:35

## 2024-01-30 RX ADMIN — LORAZEPAM 1 MG: 2 INJECTION INTRAMUSCULAR; INTRAVENOUS at 19:45

## 2024-01-30 RX ADMIN — LORAZEPAM 1 MG: 2 INJECTION INTRAMUSCULAR; INTRAVENOUS at 07:30

## 2024-01-30 RX ADMIN — LORAZEPAM 1 MG: 2 INJECTION INTRAMUSCULAR; INTRAVENOUS at 11:38

## 2024-01-30 NOTE — CARE COORDINATION
1/30/2024: SS NOTE:  Pt discussed in IDR, per nursing report and chart review pt actively dying, HOV following for comfort care and support for family.Liaison at [facility] aware.Electronically signed by GERALDINE Ford on 1/30/2024 at 10:34 AM

## 2024-01-30 NOTE — PROGRESS NOTES
HOSPICE [facility]    Met patient and family at bedside. Patient resting quietly with extended periods of apnea. Patient appears peaceful. Emotional support provided to family. Patient is not stable for transport out of hospital. HOTV to continue to follow.     Electronically signed by Eleanor Tillman RN on 1/30/2024 at 11:52 AM  [phone]: [phone]

## 2024-01-30 NOTE — PROGRESS NOTES
Palliative Care Department  [phone]  Palliative Care Progress Note  Provider MERT Hernandez CNP     Josie Dominguez  [id number]  Hospital Day: 8  Date of Initial Consult: 1/26/2024  Referring Provider: Nigel Torre APRN - CNP  Palliative Medicine was consulted for assistance with: Goals of care, overwhelming symptoms    HPI:   Josie Dominguez is a 88 y.o. with a medical history of thyroid disease, arthritis, uterine cancer, COPD, history of CVA, GERD, hyperlipidemia, hypertension, atrial fibrillation, who was admitted on 1/23/2024 from nursing facility with a CHIEF COMPLAINT of left hip pain.  Patient was being transferred from her wheelchair to her bed when she heard a crack and had immediate left hip pain. X-ray left knee/femur demonstrates acute supracondylar femur fracture that appears to be shortened and in procurvatum.  Difficult to assess if fracture extends intra-articularly. CT left femur/knee demonstrates fracture noted above.  There does appear to be some comminution about the fracture site.  The fracture does appear to extend anteriorly to the articular surface.  Patient had ORIF of left distal femur on 1/24. Palliative medicine consult for further assistance.     ASSESSMENT/PLAN:     Pertinent Hospital Diagnoses     Displaced fracture of the condyle of the left femur s/p ORIF left distal femur  Deformity of the left pubic rami  Acute on chronic anemia  COPD  Atrial fibrillation  History of uterine cancer  COPD  History of CVA      Palliative Care Encounter / Counseling Regarding Goals of Care  Please see detailed goals of care discussion as below  At this time, Josie Dominguez, Does Not have capacity for medical decision-making.  Capacity is time limited and situation/question specific  During encounter Seble Pichardo were surrogate medical decision-maker  Outcome of goals of care meeting:   Comfort measures  Code status DNR-CC  Advanced Directives: POA or living will in  epic  Surrogate/Legal NOK:  Seble Burt (child/POA) [phone]  Kylee Dominguez (child) [phone], [phone]    Spiritual assessment: no spiritual distress identified  Bereavement and grief: to be determined  Referrals to: none  SUBJECTIVE:     Current medical issues leading to Palliative Medicine involvement include   Active Hospital Problems    Diagnosis Date Noted    Hospice care [Z51.5] 01/28/2024    Displaced supracondylar fracture without intracondylar extension of lower end of left femur, initial encounter for closed fracture (HCC) [S72.452A] 01/24/2024    Other fracture of left femur, initial encounter for closed fracture (HCC) [S72.8X2A] 01/23/2024       Details of Conversation:    Chart reviewed.  Patient seen at the bedside, unresponsive.  Patient's daughters present at the bedside.  Patient appears to be having long periods of apnea and very near end-of-life.  Patient is not stable for transfer out of the hospital setting as she is actively dying.  Emotional support provided to family.  All questions and concerns addressed.  Palliative medicine will continue to follow.    OBJECTIVE:   Prognosis: Poor    Physical Exam: per nursing   BP (!) 100/51   Pulse (!) 113   Temp (!) 101.1 °F (38.4 °C) (Axillary)   Resp 10   Ht 1.753 m (5' 9\")   Wt 54.4 kg (120 lb)   SpO2 (!) 86%   BMI 17.72 kg/m²   Constitutional:  Elderly, thin, unresponsive  Eyes: no scleral icterus, normal lids, no discharge  ENMT:  Normocephalic, atraumatic, mucosa moist, EOMI  Neck:  trachea midline, no JVD  Lungs: Diminished  Heart::  ST  Abd:  Soft, non tender, non distended, bowel sounds present  Ext:  left sided deficits, no edema, pulses present  Skin:  Warm and dry,  Psych: non-anxious affect  Neuro: Unresponsive, unable to follow commands    Objective data reviewed: labs, images, records, medication use, vitals, and chart    Discussed patient and the plan of care with the other IDT members: Floor Nurse, Patient, and

## 2024-01-30 NOTE — PROGRESS NOTES
Internal Medicine Progress Note    [facility]    Blane Luz D.O., ANMOL.                    Edward Apple D.O., ANMOL.                             Kirt Reina D.O.       Iliana Booth, MSN, APRN, NP-C  Bhaskar Bolden, MSN, APRN-CNP  Nigel Torre, MSN, APRN-CNP     Primary Care Physician: Clarence Orosco MD   Admitting Physician:  Blane Luz DO  Admission date and time: 1/23/2024  6:15 PM    Room:  [address] [place] [address]  Admitting diagnosis: Other fracture of left femur, initial encounter for closed fracture (HCC) [S72.8X2A]    Patient Name: Josie Dominguez  MRN: [id number]    Date of Service: 1/30/2024     Subjective:  Josie is a 88 y.o. female who was seen and examined today,1/30/2024, at the bedside.  Josie was evaluated in the presence of her daughter today.  She is actively dying at this point and is breathing approximately 4 times per minute.  We anticipate her passing in the near future and discussed maintaining hospitalization with comfort measures.    Review of systems:   Unable to be obtained in the patient's current condition.    Physical Exam:  No intake/output data recorded.    Intake/Output Summary (Last 24 hours) at 1/30/2024 0706  Last data filed at 1/29/2024 1252  Gross per 24 hour   Intake 0 ml   Output --   Net 0 ml   I/O last 3 completed shifts:  In: 0   Out: 150 [Urine:150]  No data found.    Vital Signs:   Blood pressure (!) 100/51, pulse (!) 113, temperature (!) 101.1 °F (38.4 °C), temperature source Axillary, resp. rate 10, height 1.753 m (5' 9\"), weight 54.4 kg (120 lb), SpO2 (!) 86 %, not currently breastfeeding.    General appearance:  Resting comfortably  Head:  Normocephalic. No masses, lesions or tenderness.  Eyes:  PERRLA.  EOMI.  Sclera clear.  Buccal mucosa moist.  ENT:  Ears normal. Mucosa normal.  Neck:    Supple. Trachea midline. No thyromegaly. No JVD. No bruits.  Heart:    Rhythm regular. Rate controlled.  S1 and S2.  Lungs:   the patient and/or family with face to face contact.  This time was spent reviewing notes and laboratory data as well as instructing and counseling the patient. Time I spent with the family or surrogate(s) is included only if the patient was incapable of providing the necessary information or participating in medical decisions. I also discussed the differential diagnosis and all of the proposed management plans with the patient and individuals accompanying the patient.    Edward Apple DO,   1/30/2024  7:06 AM

## 2024-01-31 PROCEDURE — 1200000000 HC SEMI PRIVATE

## 2024-01-31 PROCEDURE — 6360000002 HC RX W HCPCS

## 2024-01-31 PROCEDURE — 6370000000 HC RX 637 (ALT 250 FOR IP): Performed by: NURSE PRACTITIONER

## 2024-01-31 PROCEDURE — 2580000003 HC RX 258

## 2024-01-31 RX ADMIN — GLYCOPYRROLATE 0.2 MG: 0.2 INJECTION INTRAMUSCULAR; INTRAVENOUS at 18:24

## 2024-01-31 RX ADMIN — MORPHINE SULFATE 5 MG/HR: 10 INJECTION, SOLUTION INTRAMUSCULAR; INTRAVENOUS at 10:11

## 2024-01-31 RX ADMIN — LORAZEPAM 1 MG: 2 INJECTION INTRAMUSCULAR; INTRAVENOUS at 07:39

## 2024-01-31 RX ADMIN — LORAZEPAM 1 MG: 2 INJECTION INTRAMUSCULAR; INTRAVENOUS at 18:20

## 2024-01-31 RX ADMIN — LORAZEPAM 1 MG: 2 INJECTION INTRAMUSCULAR; INTRAVENOUS at 23:40

## 2024-01-31 RX ADMIN — LORAZEPAM 1 MG: 2 INJECTION INTRAMUSCULAR; INTRAVENOUS at 11:41

## 2024-01-31 RX ADMIN — LORAZEPAM 1 MG: 2 INJECTION INTRAMUSCULAR; INTRAVENOUS at 15:24

## 2024-01-31 RX ADMIN — LORAZEPAM 1 MG: 2 INJECTION INTRAMUSCULAR; INTRAVENOUS at 03:30

## 2024-01-31 NOTE — PROGRESS NOTES
HOSPICE [facility]    Met patient and family at bedside. Patient resting quietly. Emotional support provided. HOTV to continue to follow.     Electronically signed by Eleanor Tillman RN on 1/31/2024 at 10:06 AM  [phone]: [phone]

## 2024-01-31 NOTE — PROGRESS NOTES
Internal Medicine Progress Note    [facility]    Blane Luz D.O., ANMOL.                    Edward Apple D.O., TIMUR Reina D.O.       Iliana Booth, MSN, APRN, NP-C  Bhaskar Bolden, MSN, APRN-CNP  Nigel Torre, MSN, APRN-CNP     Primary Care Physician: Clarence Orosco MD   Admitting Physician:  Blane Luz DO  Admission date and time: 1/23/2024  6:15 PM    Room:  [address] [place] [address]  Admitting diagnosis: Other fracture of left femur, initial encounter for closed fracture (HCC) [S72.8X2A]    Patient Name: Josie Dominguez  MRN: [id number]    Date of Service: 1/31/2024     Subjective:  Josie is a 88 y.o. female who was seen and examined today,1/31/2024, at the bedside.  Josie was evaluated in the presence of her daughter today.  She is breathing approximately 2 times per minute.  She appears very comfortable and in no distress whatsoever.    Review of systems:   Unable to be obtained in the patient's current condition.    Physical Exam:  No intake/output data recorded.  No intake or output data in the 24 hours ending 01/31/24 0719  No intake/output data recorded.  No data found.    Vital Signs:   Blood pressure (!) 100/51, pulse (!) 113, temperature (!) 101.1 °F (38.4 °C), temperature source Axillary, resp. rate 10, height 1.753 m (5' 9\"), weight 54.4 kg (120 lb), SpO2 (!) 86 %, not currently breastfeeding.    General appearance:  Resting comfortably  Head:  Normocephalic. No masses, lesions or tenderness.  Eyes:  PERRLA.  EOMI.  Sclera clear.  Buccal mucosa moist.  ENT:  Ears normal. Mucosa normal.  Neck:    Supple. Trachea midline. No thyromegaly. No JVD. No bruits.  Heart:    Rhythm regular. Rate controlled.  S1 and S2.  Lungs:    Symmetrical.  Somewhat shallow.  Clear to auscultation bilaterally.  No wheezes. No rhonchi. No rales.  Abdomen:   Soft. Non-tender. Non-distended. Bowel sounds positive. No organomegaly or masses.  No

## 2024-02-01 PROCEDURE — 6370000000 HC RX 637 (ALT 250 FOR IP): Performed by: NURSE PRACTITIONER

## 2024-02-01 PROCEDURE — 6360000002 HC RX W HCPCS

## 2024-02-01 PROCEDURE — 6360000002 HC RX W HCPCS: Performed by: INTERNAL MEDICINE

## 2024-02-01 PROCEDURE — 2580000003 HC RX 258: Performed by: INTERNAL MEDICINE

## 2024-02-01 PROCEDURE — 51702 INSERT TEMP BLADDER CATH: CPT

## 2024-02-01 PROCEDURE — 1200000000 HC SEMI PRIVATE

## 2024-02-01 PROCEDURE — 2580000003 HC RX 258

## 2024-02-01 PROCEDURE — 99231 SBSQ HOSP IP/OBS SF/LOW 25: CPT | Performed by: NURSE PRACTITIONER

## 2024-02-01 RX ADMIN — LORAZEPAM 1 MG: 2 INJECTION INTRAMUSCULAR; INTRAVENOUS at 16:05

## 2024-02-01 RX ADMIN — LORAZEPAM 1 MG: 2 INJECTION INTRAMUSCULAR; INTRAVENOUS at 02:44

## 2024-02-01 RX ADMIN — MORPHINE SULFATE 8 MG/HR: 10 INJECTION, SOLUTION INTRAMUSCULAR; INTRAVENOUS at 23:50

## 2024-02-01 RX ADMIN — LORAZEPAM 1 MG: 2 INJECTION INTRAMUSCULAR; INTRAVENOUS at 11:16

## 2024-02-01 RX ADMIN — LORAZEPAM 1 MG: 2 INJECTION INTRAMUSCULAR; INTRAVENOUS at 23:30

## 2024-02-01 RX ADMIN — GLYCOPYRROLATE 0.2 MG: 0.2 INJECTION INTRAMUSCULAR; INTRAVENOUS at 05:38

## 2024-02-01 RX ADMIN — LORAZEPAM 1 MG: 2 INJECTION INTRAMUSCULAR; INTRAVENOUS at 19:34

## 2024-02-01 RX ADMIN — LORAZEPAM 1 MG: 2 INJECTION INTRAMUSCULAR; INTRAVENOUS at 06:52

## 2024-02-01 RX ADMIN — MORPHINE SULFATE 5 MG/HR: 10 INJECTION, SOLUTION INTRAMUSCULAR; INTRAVENOUS at 07:26

## 2024-02-01 NOTE — PROGRESS NOTES
Internal Medicine Progress Note    [facility]    Blane Luz D.O., ANMOL.                    Edward Apple D.O., TIMUR Reina D.O.       Iliana Booth, MSN, APRN, NP-C  Bhaskar Bolden, MSN, APRN-CNP  Nigel Torre, MSN, APRN-CNP     Primary Care Physician: Clarence Orosco MD   Admitting Physician:  Blane Luz DO  Admission date and time: 1/23/2024  6:15 PM    Room:  [address] [place] [address]  Admitting diagnosis: Other fracture of left femur, initial encounter for closed fracture (HCC) [S72.8X2A]    Patient Name: Josie Dominguez  MRN: [id number]    Date of Service: 2/1/2024     Subjective:  Josie is a 88 y.o. female who was seen and examined today,2/1/2024, at the bedside.  Patient has left the room while nursing are doing a.m. care today.  Nursing relates that patient is having pain with any care especially while touching.  Discussed the condition with hospice nurse at which time morphine drip will be titrated up.  Patient respiration is shallow.      Review of systems:   Unable to be obtained in the patient's current condition.    Physical Exam:  I/O this shift:  In: -   Out: 500 [Urine:500]    Intake/Output Summary (Last 24 hours) at 2/1/2024 1543  Last data filed at 2/1/2024 1006  Gross per 24 hour   Intake --   Output 500 ml   Net -500 ml     No intake/output data recorded.  No data found.    Vital Signs:   Blood pressure (!) 82/46, pulse 96, temperature 99.4 °F (37.4 °C), temperature source Axillary, resp. rate 24, height 1.753 m (5' 9\"), weight 54.4 kg (120 lb), SpO2 (!) 58 %, not currently breastfeeding.    General appearance:  Resting comfortably except with stimulation or care  Head:  Normocephalic. No masses, lesions or tenderness.  Eyes:  PERRLA.  EOMI.  Sclera clear.  Buccal mucosa moist.  ENT:  Ears normal. Mucosa normal.  Neck:    Supple. Trachea midline. No thyromegaly. No JVD. No bruits.  Heart:    Rhythm regular. Rate  counseling the patient. Time I spent with the family or surrogate(s) is included only if the patient was incapable of providing the necessary information or participating in medical decisions. I also discussed the differential diagnosis and all of the proposed management plans with the patient and individuals accompanying the patient.    Blane Luz DO,   2/1/2024  3:43 PM

## 2024-02-01 NOTE — PROGRESS NOTES
Palliative Care Department  [phone]  Palliative Care Progress Note  Provider MERT Hernandez CNP     Josie Dominguez  [id number]  Hospital Day: 10  Date of Initial Consult: 1/26/2024  Referring Provider: Nigel Torre APRN - CNP  Palliative Medicine was consulted for assistance with: Goals of care, overwhelming symptoms    HPI:   Josie Dominguez is a 88 y.o. with a medical history of thyroid disease, arthritis, uterine cancer, COPD, history of CVA, GERD, hyperlipidemia, hypertension, atrial fibrillation, who was admitted on 1/23/2024 from nursing facility with a CHIEF COMPLAINT of left hip pain.  Patient was being transferred from her wheelchair to her bed when she heard a crack and had immediate left hip pain. X-ray left knee/femur demonstrates acute supracondylar femur fracture that appears to be shortened and in procurvatum.  Difficult to assess if fracture extends intra-articularly. CT left femur/knee demonstrates fracture noted above.  There does appear to be some comminution about the fracture site.  The fracture does appear to extend anteriorly to the articular surface.  Patient had ORIF of left distal femur on 1/24. Palliative medicine consult for further assistance.     ASSESSMENT/PLAN:     Pertinent Hospital Diagnoses     Displaced fracture of the condyle of the left femur s/p ORIF left distal femur  Deformity of the left pubic rami  Acute on chronic anemia  COPD  Atrial fibrillation  History of uterine cancer  COPD  History of CVA      Palliative Care Encounter / Counseling Regarding Goals of Care  Please see detailed goals of care discussion as below  At this time, Josie Dominguez, Does Not have capacity for medical decision-making.  Capacity is time limited and situation/question specific  During encounter Seble Pichardo were surrogate medical decision-maker  Outcome of goals of care meeting:   Comfort measures  Code status DNR-CC  Advanced Directives: POA or living will in

## 2024-02-01 NOTE — PLAN OF CARE
Problem: Safety - Adult  Goal: Free from fall injury  Outcome: Progressing     Problem: ABCDS Injury Assessment  Goal: Absence of physical injury  Outcome: Progressing     Problem: Chronic Conditions and Co-morbidities  Goal: Patient's chronic conditions and co-morbidity symptoms are monitored and maintained or improved  Outcome: Progressing

## 2024-02-01 NOTE — PROGRESS NOTES
HOSPICE [facility]    Met patient at bedside. Updates received from nursing. Emotional support provided to family. HOTV to continue to follow.     Electronically signed by Eleanor Tillman RN on 2/1/2024 at 10:50 AM  [phone]: [phone]

## 2024-02-02 PROCEDURE — 6360000002 HC RX W HCPCS: Performed by: INTERNAL MEDICINE

## 2024-02-02 PROCEDURE — 6360000002 HC RX W HCPCS

## 2024-02-02 PROCEDURE — 2580000003 HC RX 258: Performed by: INTERNAL MEDICINE

## 2024-02-02 PROCEDURE — 1200000000 HC SEMI PRIVATE

## 2024-02-02 RX ADMIN — LORAZEPAM 1 MG: 2 INJECTION INTRAMUSCULAR; INTRAVENOUS at 16:26

## 2024-02-02 RX ADMIN — GLYCOPYRROLATE 0.2 MG: 0.2 INJECTION INTRAMUSCULAR; INTRAVENOUS at 08:04

## 2024-02-02 RX ADMIN — MORPHINE SULFATE 8.5 MG/HR: 10 INJECTION, SOLUTION INTRAMUSCULAR; INTRAVENOUS at 13:11

## 2024-02-02 RX ADMIN — LORAZEPAM 1 MG: 2 INJECTION INTRAMUSCULAR; INTRAVENOUS at 20:25

## 2024-02-02 RX ADMIN — LORAZEPAM 1 MG: 2 INJECTION INTRAMUSCULAR; INTRAVENOUS at 12:06

## 2024-02-02 RX ADMIN — LORAZEPAM 1 MG: 2 INJECTION INTRAMUSCULAR; INTRAVENOUS at 23:45

## 2024-02-02 RX ADMIN — LORAZEPAM 1 MG: 2 INJECTION INTRAMUSCULAR; INTRAVENOUS at 04:18

## 2024-02-02 RX ADMIN — LORAZEPAM 1 MG: 2 INJECTION INTRAMUSCULAR; INTRAVENOUS at 08:04

## 2024-02-02 NOTE — PROGRESS NOTES
Internal Medicine Progress Note    [facility]    Blane Luz D.O., ANMOL.                    Edward Apple D.O., TIMUR Reina D.O.       Iliana Booth, MSN, APRN, NP-C  Bhaskar Bolden, MSN, APRN-CNP  Nigel Torre, MSN, APRN-CNP     Primary Care Physician: Clarence Orosco MD   Admitting Physician:  Blane Luz DO  Admission date and time: 1/23/2024  6:15 PM    Room:  [address] [place] [address]  Admitting diagnosis: Other fracture of left femur, initial encounter for closed fracture (HCC) [S72.8X2A]    Patient Name: Josie Dominguez  MRN: [id number]    Date of Service: 2/2/2024     Subjective:  Josie is a 88 y.o. female who was seen and examined today,2/2/2024, at the bedside.  Patient condition continued to decline.  Despite the addition of morphine the patient respiration are decreased.  Longer periods of apnea as noted today.  She appeared to be resting comfortably without oxygen    Sister and daughter present at the bedside    Review of systems:   Unable to be obtained in the patient's current condition.    Physical Exam:  No intake/output data recorded.  No intake or output data in the 24 hours ending 02/02/24 1347    I/O last 3 completed shifts:  In: -   Out: 1300 [Urine:1300]  No data found.    Vital Signs:   Blood pressure (!) 78/40, pulse 97, temperature 98.5 °F (36.9 °C), temperature source Axillary, resp. rate 22, height 1.753 m (5' 9\"), weight 54.4 kg (120 lb), SpO2 (!) 85 %, not currently breastfeeding.    General appearance:  Resting,  Head:  Normocephalic. No masses, lesions or tenderness.  Eyes:  PERRLA.  EOMI.  Sclera clear.  Buccal mucosa moist.  ENT:  Ears normal. Mucosa normal.  Neck:    Supple. Trachea midline. No thyromegaly. No JVD. No bruits.  Heart:    Rhythm regular. Rate controlled.  S1 and S2.  Lungs:    Symmetrical.  Somewhat shallow.  Clear to auscultation bilaterally.  No wheezes. No rhonchi. No rales.  Abdomen:

## 2024-02-02 NOTE — ADT AUTH CERT
01/30   Last Updated by Jose Sexton on 1/31/2024 1406     Review Status Created By   In Primary Jose Sexton       Review Type Associated Date   -- 1/31/2024      Criteria Review   DATE: 01/30/24   Acute Medical      PERTINENT UPDATES:  Per IM:  She is actively dying at this point and is breathing approximately 4 times per minute. We anticipate her passing in the near future and discussed maintaining hospitalization with comfort measures.     Per Palliative Med: Pt unresponsive. Patient appears to be having long periods of apnea and very near end-of-life.  Patient is not stable for transfer out of the hospital setting as she is actively dying.     PHYSICAL EXAM:  Heart: Rhythm regular. Rate controlled. S1 and S2.  Lungs: Symmetrical.  Somewhat shallow. Diminished. No wheezes.No rhonchi. No rales.  Extremities: Left foot swollen--improved.    Neurologic: Bordering on impounded as there is minimal response to verbal/noxious stimulation. unable to follow commands     MD CONSULTS/ASSESSMENT AND PLAN:  IM:  1. Transition to comfort measures with the patient actively dying  2. Chronic displaced fracture of the condyle of the left femur with open reduction internal fixation left distal femur fracture with retrograde femoral nail by on January 24  3. 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 Acute on chronic anemia with hemorrhagic component  5. Paroxysmal atrial fibrillation  6. Primary hypothyroidism  7. History of uterine cancer  8. COPD  9. History of CVA with residual left-sided weakness  10. Osteoporosis  11. Hyperlipidemia  12. Essential hypertension  13. Gastro esophageal reflux disease  14. Depression  15. Probable urinary tract infection  16. 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 pain medication  -Will discontinue lab.  Awaiting hospice evaluation and palliative care decision     MEDICATIONS:  Lasix 40mg PO qd (Held)  Dilaudid 1mg IV q2h PRN x2  Morphine 10mg PO q2h PRN x3 (1525-D/C'd)  Roxicodone 10mg PO q4h PRN x2 (0852-D/C'd)     ORDERS:  ADULT DIET; Regular      SW Notes:  Pt is a LTC pt from [facility] but were attempting to accept her back skilled for rehab however family report not wanting their mother now to return to [facility], do not feel she can tolerate rehab, are wanting her to remain at the hospital and to be made comfortable     Pre Hospice Nurse:  Pt states pain level was 10 out of 10.

## 2024-02-03 VITALS
RESPIRATION RATE: 18 BRPM | OXYGEN SATURATION: 85 % | HEIGHT: 69 IN | TEMPERATURE: 98.4 F | WEIGHT: 120 LBS | BODY MASS INDEX: 17.77 KG/M2 | SYSTOLIC BLOOD PRESSURE: 79 MMHG | DIASTOLIC BLOOD PRESSURE: 44 MMHG

## 2024-02-03 PROCEDURE — 6360000002 HC RX W HCPCS

## 2024-02-03 PROCEDURE — 6360000002 HC RX W HCPCS: Performed by: INTERNAL MEDICINE

## 2024-02-03 PROCEDURE — 2580000003 HC RX 258: Performed by: INTERNAL MEDICINE

## 2024-02-03 RX ADMIN — LORAZEPAM 1 MG: 2 INJECTION INTRAMUSCULAR; INTRAVENOUS at 03:22

## 2024-02-03 RX ADMIN — LORAZEPAM 1 MG: 2 INJECTION INTRAMUSCULAR; INTRAVENOUS at 07:19

## 2024-02-03 RX ADMIN — MORPHINE SULFATE 8 MG/HR: 10 INJECTION, SOLUTION INTRAMUSCULAR; INTRAVENOUS at 00:08

## 2024-02-03 ASSESSMENT — PAIN SCALES - WONG BAKER: WONGBAKER_NUMERICALRESPONSE: 0

## 2024-02-03 NOTE — PROGRESS NOTES
Dr. Luz was called and notified of the following findings:  patient is unresponsive with non-reactive pupils; no pulses palpated; no spontaneous breaths; no heart sounds auscultated.  Thu Lynch RN and Emmie Bedolla RN pronounced patient dead at 1015 on 2/3/2024.  Dr. Luz will sign the death certificate.      [facility] called and stated body could be released. Reference #[id number].    [facility] office called. Spoke with Lulu (phone #[phone]). Case deferred by .

## 2024-02-03 NOTE — PLAN OF CARE
Problem: Discharge Planning  Goal: Discharge to home or other facility with appropriate resources  Outcome: Progressing     Problem: Safety - Adult  Goal: Free from fall injury  Outcome: Progressing     Problem: ABCDS Injury Assessment  Goal: Absence of physical injury  Outcome: Progressing     Problem: Chronic Conditions and Co-morbidities  Goal: Patient's chronic conditions and co-morbidity symptoms are monitored and maintained or improved  Outcome: Progressing     Problem: Dyspnea Due to End of Life  Goal: Demonstrate understanding of and ability to manage respiratory symptoms at end of life  Description: Patient  and or family/caregiver will verbalize recall of breathing strategies to maintain an effective breathing pattern during the inpatient hospice stay.        Outcome: Progressing

## 2024-02-04 NOTE — DISCHARGE SUMMARY
[place] [id number]                               DISCHARGE SUMMARY    PATIENT NAME: ROSALES TALBERT                   :        1935  MED REC NO:   [id number]                            ROOM:       0330  ACCOUNT NO:   [id number]                           ADMIT DATE: 2024  PROVIDER:     Blane Luz DO                  DISCHARGE DATE:  2024    ADMITTING DIAGNOSES:  Chronic displaced fracture of the condylar left  femur with open reduction internal fixation, distal femur fracture with  retrograde femoral nail by Dr. Little on 2024, deformity of left  pubic rami likely old injury.    SECONDARY DISCHARGE DIAGNOSES:  Acute-on-chronic anemia with hemorrhagic  component, paroxysmal atrial fibrillation, primary hypothyroidism,  history of uterine cancer, chronic obstructive pulmonary disease,  history of CVA with residual left-sided weakness, osteoporosis,  hyperlipidemia, essential hypertension, gastroesophageal reflux disease,  depression, urinary tract infection.    COMPLICATIONS:  Postsurgical anemia.  Failure to thrive postoperatively.    OPERATION PERFORMED:  Internal fixation of left distal femur fracture  with retrograde femoral nail by Dr. Littel on 2024, hemorrhagic  anemia with transfusion.    CONSULTATION:  Obtained with Palliative Care and Dr. Little.    ADMITTING PROVIDERS:  Dr. Luz and Dr. Apple.    CHIEF COMPLAINT AND HISTORY OF CHIEF COMPLAINT:  This is an elderly  debilitated 88-year-old white female who was admitted to [facility].  The patient presented from [facility] due to left hip pain.  The patient is a poor historian with  underlying dementia.  The patient apparently fell 2 days prior, at which  time there was a crack.  She presented to the hospital here with  diagnosis of fracture.    PAST MEDICAL HISTORY:  Positive for usual

## (undated) DEVICE — BANDAGE COMPR W6INXL5YD SELF ADH COHESIVE CO FLX

## (undated) DEVICE — APPLICATOR MEDICATED 26 CC SOLUTION HI LT ORNG CHLORAPREP

## (undated) DEVICE — PAD,ABDOMINAL,5"X9",ST,LF,25/BX: Brand: MEDLINE INDUSTRIES, INC.

## (undated) DEVICE — BLOCK BITE 60FR CAREGUARD

## (undated) DEVICE — COVER,LIGHT HANDLE,FLX,1/PK: Brand: MEDLINE INDUSTRIES, INC.

## (undated) DEVICE — YANKAUER,BULB TIP,W/O VENT,RIGID,STERILE: Brand: MEDLINE

## (undated) DEVICE — TOWEL,OR,DSP,ST,BLUE,STD,6/PK,12PK/CS: Brand: MEDLINE

## (undated) DEVICE — Device

## (undated) DEVICE — 4-PORT MANIFOLD: Brand: NEPTUNE 2

## (undated) DEVICE — SPONGE,LAP,12"X12",XR,ST,5/PK,40PK/CS: Brand: MEDLINE

## (undated) DEVICE — MASK,FACE,MAXFLUIDPROTECT,SHIELD/ERLPS: Brand: MEDLINE

## (undated) DEVICE — DRAPE C ARM W41XL65IN UNIV W/ CLP AND RUBBERBAND

## (undated) DEVICE — FREEHAND DRILL

## (undated) DEVICE — GLOVE ORANGE PI 8   MSG9080

## (undated) DEVICE — KENDALL 450 SERIES MONITORING FOAM ELECTRODE - RECTANGULAR SHAPE ( 3/PK): Brand: KENDALL

## (undated) DEVICE — WIPES SKIN CLOTH READYPREP 9 X 10.5 IN 2% CHLORHEX GLUCONATE CHG PREOP

## (undated) DEVICE — PADDING,UNDERCAST,COTTON, 4"X4YD STERILE: Brand: MEDLINE

## (undated) DEVICE — SYRINGE IRRIG 60ML SFT PLIABLE BLB EZ TO GRP 1 HND USE W/

## (undated) DEVICE — SOLUTION IRRIG 1000ML 0.9% SOD CHL USP POUR PLAS BTL

## (undated) DEVICE — 6 X 9  1.75MIL 4-WALL LABGUARD: Brand: MINIGRIP COMMERCIAL LLC

## (undated) DEVICE — SPONGE GZ 4IN 4IN 4 PLY N WVN AVANT

## (undated) DEVICE — Device: Brand: DEFENDO VALVE AND CONNECTOR KIT

## (undated) DEVICE — DRESSING GZ W1XL8IN COT XRFRM N ADH OVERWRAP CURAD

## (undated) DEVICE — FORCEPS BX L240CM JAW DIA2.8MM L CAP W/ NDL MIC MESH TOOTH

## (undated) DEVICE — LUBRICANT SURG JELLY ST BACTER TUBE 4.25OZ

## (undated) DEVICE — 3M™ STERI-DRAPE™ U-DRAPE 1015: Brand: STERI-DRAPE™

## (undated) DEVICE — BLADE,STAINLESS-STEEL,10,STRL,DISPOSABLE: Brand: MEDLINE

## (undated) DEVICE — ELECTRODE PT RET AD L9FT HI MOIST COND ADH HYDRGEL CORDED

## (undated) DEVICE — GLOVE ORTHO 8   MSG9480

## (undated) DEVICE — MARKER,SKIN,WI/RULER AND LABELS: Brand: MEDLINE

## (undated) DEVICE — LOCKING DRILL

## (undated) DEVICE — SPONGE LAP W18XL18IN WHT COT 4 PLY FLD STRUNG RADPQ DISP ST 2 PER PACK

## (undated) DEVICE — CONTAINER SPEC COLL 960ML POLYPR TRIANG GRAD INTAKE/OUTPUT

## (undated) DEVICE — DRESSING HYDROFIBER AQUACEL AG ADVANTAGE 3.5X6 IN

## (undated) DEVICE — SURGICAL PROCEDURE PACK ORTH IV ECLIPSE STRL

## (undated) DEVICE — KIT BEDSIDE REVITAL OX 500ML

## (undated) DEVICE — GAUZE,SPONGE,4"X4",16PLY,STRL,LF,10/TRAY: Brand: MEDLINE

## (undated) DEVICE — BANDAGE COMPR M W6INXL10YD WHT BGE VELC E MTRX HK AND LOOP

## (undated) DEVICE — CONTAINER SPEC 480ML CLR POLYSTYR 10% NEUT BUFF FRMLN ZN

## (undated) DEVICE — NDL CNTR 40CT FM MAG: Brand: MEDLINE INDUSTRIES, INC.

## (undated) DEVICE — GOWN ISOLATN REG YEL M WT MULTIPLY SIDETIE LEV 2

## (undated) DEVICE — GOWN,SIRUS,POLYRNF,RAGLAN,XL,ST,30/CS: Brand: MEDLINE

## (undated) DEVICE — BASIC DOUBLE BASIN 2-LF: Brand: MEDLINE INDUSTRIES, INC.

## (undated) DEVICE — 3M™ IOBAN™ 2 ANTIMICROBIAL INCISE DRAPE 6651EZ: Brand: IOBAN™ 2

## (undated) DEVICE — SHEET,DRAPE,70X100,STERILE: Brand: MEDLINE

## (undated) DEVICE — TUBING, SUCTION, 1/4" X 10', STRAIGHT: Brand: MEDLINE

## (undated) DEVICE — 3M™ IOBAN™ 2 ANTIMICROBIAL INCISE DRAPE 6650EZ: Brand: IOBAN™ 2

## (undated) DEVICE — GUIDE WIRE, BALL-TIPPED, STERILE